# Patient Record
Sex: MALE | Race: WHITE
[De-identification: names, ages, dates, MRNs, and addresses within clinical notes are randomized per-mention and may not be internally consistent; named-entity substitution may affect disease eponyms.]

---

## 2021-06-28 NOTE — XMS
PreManage Notification: AMOS PEACOCK MRN:[id number]
 
Security Information
 
Security Events
No recent Security Events currently on file
 
 
 
CRITERIA MET
------------
- [facility] - 2 Visits in 30 Days
 
 
CARE PROVIDERS
There are no care providers on record at this time.
 
[facility] has no Care Guidelines for this patient.
 
CLAIRE VISIT COUNT (12 MO.)
-------------------------------------------------------------------------------------
[facility]
 
1 [facility]
-------------------------------------------------------------------------------------
TOTAL 5
-------------------------------------------------------------------------------------
NOTE: Visits indicate total known visits.
 
ED/C VISIT TRACKING (12 MO.)
-------------------------------------------------------------------------------------
06/28/2021 06:52
[facility] [place]
 
TYPE: Emergency
 
COMPLAINT:
- WEAKNESS
-------------------------------------------------------------------------------------
[facility]2021 15:[facility]
 
[facility]
 
DIAGNOSES:
- Slow transit constipation
- Pleurodynia
- Back Pain
- Rib Pain
-------------------------------------------------------------------------------------
06/25/2021 13:07
[facility] [place]
 
[facility]
 
DIAGNOSES:
- Rib Pain
- poss lt side rib pain
-------------------------------------------------------------------------------------
 
06/18/2021 17:12
[facility] [place]
 
[facility]
 
DIAGNOSES:
- Constipation
- Constipation, unspecified
- Constipation 3WKS!!!
-------------------------------------------------------------------------------------
08/26/2020 14:49
[facility] [place]
 
[facility]
 
DIAGNOSES:
- Skin Redness With Swelling
- Cellulitis of left lower limb
- Tachycardia, unspecified
- leg swelling
- Localized edema
-------------------------------------------------------------------------------------
 
 
INPATIENT VISIT TRACKING (12 MO.)
No inpatient visits to display in this time frame
 
[email or web address]

## 2021-08-26 NOTE — EKG
[facility]
                                    [address]
                                  [place], [place]  [address]
_________________________________________________________________________________________
                                                                 Signed   
 
 
Sinus tachycardia
Right bundle branch block
Possible Lateral infarct , age undetermined
Abnormal ECG
No previous ECGs available
Confirmed by DUKE REIS DO ([id number]) on 8/26/2021 7:17:55 PM
 
 
 
 
 
 
 
 
 
 
 
 
 
 
 
 
 
 
 
 
 
 
 
 
 
 
 
 
 
 
 
 
 
 
 
 
 
 
 
    Electronically Signed By: DUKE REIS DO  08/26/21 1918
_________________________________________________________________________________________
PATIENT NAME:     AMOS PEACOCK                     
MEDICAL RECORD #: [id number]                     Electrocardiogram             
          ACCT #: [id number]  
DATE OF BIRTH:   [birth year]                                       
PHYSICIAN:   DUKE REIS DO                     REPORT #: [id number]
REPORT IS CONFIDENTIAL AND NOT TO BE RELEASED WITHOUT AUTHORIZATION

## 2021-08-28 NOTE — XMS
PreManage Notification: AMOS PEACOCK MRN:[id number]
 
Security Information
 
Security Events
No recent Security Events currently on file
 
 
 
CRITERIA MET
------------
- [facility] - 2 Visits in 30 Days
- [facility] - 3 Facilities in 90 Days
- 6 ED Visits in 6 Months
 
 
CARE PROVIDERS
There are no care providers on record at this time.
 
[facility] has no Care Guidelines for this patient.
 
E.DEm VISIT COUNT (12 MO.)
-------------------------------------------------------------------------------------
3 [address]
 
3 [facility] [place] [facility].
-------------------------------------------------------------------------------------
TOTAL 7
-------------------------------------------------------------------------------------
NOTE: Visits indicate total known visits.
 
ED/C VISIT TRACKING (12 MO.)
-------------------------------------------------------------------------------------
08/28/2021 18:13
[facility] [place]
 
TYPE: Emergency
 
COMPLAINT:
- LACERATION
-------------------------------------------------------------------------------------
08/25/2021 17:41
[facility] [place]
 
TYPE: Emergency
 
COMPLAINT:
- ABD PAIN
-------------------------------------------------------------------------------------
07/21/2021 18:02
[facility]
 
TYPE: Emergency
 
DIAGNOSES:
- Pneumonia, unspecified organism
 
- Hypoxemia
- EMS
- Acute respiratory failure with hypoxia
-------------------------------------------------------------------------------------
06/28/2021 06:52
[facility] [place]
 
TYPE: Emergency
 
COMPLAINT:
- WEAKNESS
 
DIAGNOSES:
- Other symptoms and signs involving the musculoskeletal system
- Other intervertebral disc degeneration, thoracic region
- Nicotine dependence, unspecified, uncomplicated
- Chronic obstructive pulmonary disease, unspecified
- Weakness
- Hematuria, unspecified
-------------------------------------------------------------------------------------
06/25/2021 15:36
[facility] [place]
 
TYPE: Emergency
 
DIAGNOSES:
- Slow transit constipation
- Pleurodynia
- Back Pain
- Rib Pain
-------------------------------------------------------------------------------------
06/25/2021 13:07
[facility]     [place]
 
TYPE: Emergency
 
DIAGNOSES:
- Rib Pain
- poss lt side rib pain
- Procedure and treatment not carried out due to patient leaving prior to being seen
by health care provider
-------------------------------------------------------------------------------------
06/18/2021 17:12
[facility]     [place]
 
TYPE: Emergency
 
DIAGNOSES:
- Constipation
- Constipation, unspecified
- Constipation 3WKS!!!
-------------------------------------------------------------------------------------
 
 
INPATIENT VISIT TRACKING (12 MO.)
-------------------------------------------------------------------------------------
07/21/2021 18:02
[facility]       [place]
 
 
TYPE: General Medicine
 
DIAGNOSES:
- Acute respiratory failure with hypoxia
- Pneumonia, unspecified organism
-------------------------------------------------------------------------------------
06/28/2021 16:20
[facility]       [place]
 
TYPE: Surgery
 
DIAGNOSES:
- Intraspinal abscess and granuloma
- T7 osteomyelitis with lower extremity paralysis
- Osteomyelitis, unspecified
-------------------------------------------------------------------------------------
 
[email or web address]

## 2021-09-05 NOTE — XMS
PreManage Notification: AMOS PEACOCK MRN:[id number]
 
Security Information
 
Security Events
No recent Security Events currently on file
 
 
 
CRITERIA MET
------------
- 6 ED Visits in 6 Months
- [facility] - 3 Facilities in 90 Days
- [facility] - 2 Visits in 30 Days
 
 
CARE PROVIDERS
There are no care providers on record at this time.
 
[facility] has no Care Guidelines for this patient.
Care History
Medical/Surgical
08/30/2021    [facility]
 
      - RECVD CASE MANAGEMENT CONSULT-TO HELP PATIENT ESTABLISH WITH A PCP IN THE 
      AREA.
      - [facility] CALLED PATIENT 2X- NO ANSWER- VOICEMAIL BOX HAS NOT BEEN SET UP TO LEAVE 
      A MESSAGE.
      - NO PCP LETTER SENT TO PATIENT- WITH CLINICS LISTED IN THE AREA.
CLAIRE VISIT COUNT (12 MO.)
-------------------------------------------------------------------------------------
3 [facility]
 
1 [address]
-------------------------------------------------------------------------------------
TOTAL 8
-------------------------------------------------------------------------------------
NOTE: Visits indicate total known visits.
 
ED/UCC VISIT TRACKING (12 MO.)
-------------------------------------------------------------------------------------
09/05/2021 11:05
[facility] [place]
 
TYPE: Emergency
 
COMPLAINT:
- WEAKNESS
-------------------------------------------------------------------------------------
08/28/2021 18:13
[facility] [place]
 
TYPE: Emergency
 
COMPLAINT:
- LACERATION
 
 
DIAGNOSES:
- Tachycardia, unspecified
- Nicotine dependence, unspecified, uncomplicated
- Chronic obstructive pulmonary disease, unspecified
- Contact with other sharp object(s), not elsewhere classified, initial encounter
- Laceration without foreign body, right lower leg, initial encounter
-------------------------------------------------------------------------------------
08/25/2021 17:41
[facility] [place]
 
TYPE: Emergency
 
COMPLAINT:
- ABD PAIN
 
DIAGNOSES:
- Other stimulant abuse, uncomplicated
- Unspecified abdominal pain
- Patient's noncompliance with other medical treatment and regimen
- Nicotine dependence, unspecified, uncomplicated
- Chronic obstructive pulmonary disease, unspecified
-------------------------------------------------------------------------------------
07/21/2021 18:02
[facility]
 
TYPE: Emergency
 
DIAGNOSES:
- Pneumonia, unspecified organism
- Hypoxemia
- EMS
- Acute respiratory failure with hypoxia
-------------------------------------------------------------------------------------
06/28/2021 06:52
[facility]
 
TYPE: Emergency
 
COMPLAINT:
- WEAKNESS
 
DIAGNOSES:
- Other symptoms and signs involving the musculoskeletal system
- Other intervertebral disc degeneration, thoracic region
- Nicotine dependence, unspecified, uncomplicated
- Chronic obstructive pulmonary disease, unspecified
- Weakness
- Hematuria, unspecified
-------------------------------------------------------------------------------------
[facility]2021 15:[facility] [place]
 
[facility]: [facility]
 
DIAGNOSES:
- Slow transit constipation
- Pleurodynia
- Back Pain
- Rib Pain
 
-------------------------------------------------------------------------------------
06[facility]25/2021 13:07
[facility]
 
[facility]
 
DIAGNOSES:
- Rib Pain
- poss lt side rib pain
- Procedure and treatment not carried out due to patient leaving prior to being seen
by health care provider
-------------------------------------------------------------------------------------
06/18/2021 17:12
[facility] [place]
 
TYPE: Emergency
 
DIAGNOSES:
- Constipation
- Constipation, unspecified
- Constipation 3WKS!!!
-------------------------------------------------------------------------------------
 
 
INPATIENT VISIT TRACKING (12 MO.)
-------------------------------------------------------------------------------------
07/21/2021 18:02
[facility]       [place]
 
TYPE: General Medicine
 
DIAGNOSES:
- Acute respiratory failure with hypoxia
- Pneumonia, unspecified organism
-------------------------------------------------------------------------------------
06/28/2021 16:20
[facility]       [place]
 
TYPE: Surgery
 
DIAGNOSES:
- Intraspinal abscess and granuloma
- T7 osteomyelitis with lower extremity paralysis
- Osteomyelitis, unspecified
-------------------------------------------------------------------------------------
 
[email or web address]

## 2021-09-05 NOTE — NUR
SHIFT REPORT RECEIVED FROM DAYSHIFT AMERICA MONTERROSO AT BEDSIDE. pt AWAKE AND
RESTING IN BED, EATING EVENING SNACK. NO NEEDS VERBALIZED, CALL LIGHT IN REACH
AND BOARD UPDATED. BLE ELEVATED WITH USE OF MULTIPLE PILLOWS.

## 2021-09-05 NOTE — NUR
IV FLUIDS ARRIVED FROM [facility]. STARTED PER MD ORDER. PT REPORTS HUNGER,
SANDWICH BOX PROVIDED. BLE ELEVATED ON PILLOWS PER MD ORDER. LEGS OF BED
ELEVATED AS WELL. OXGYEN SATURATION REMAINS 935 ON ROOM AIR. PT EATING WHILE
IN BED, HEAD OF BED ELEVATED TO 53 DEGREES. PT REPORTS HE IS UNABLE TO STAND
OR TRANSFER AT THIS TIME. PT CONTINUES TO BE VERY FIGITY. BED ALARM ON. CALL
LIGHT WITHIN REACH.

## 2021-09-05 NOTE — NUR
ASSESSMENT COMPLETE, VSS. pt ON TELE#6, NSR TO SINUS TACH, HR VARIES 90'S TO
ONE TEENS.
pt A/O, PHOTO CONSENT SIGNED
REGARDING BLE OPEN LACERATIONS. PHOTOS IN CHART. DRESSING
CHANGED COMPLETE WITH HELP FROM AMERICA MERCHANT. SKIN CLEANED WITH WOUND CLEANSER, NONADHERENT
DRESSING OVER OPEN SORES X3 WITH DRY DRESSING IN PLACE PER MD ORDERS.
PHOTOS IN CHART. pt JERKY WITH MUSCLE MOVEMENTS AT TIMES, FOLLOWS COMMANDS.
BLE ELEVATED WITH MULTIPLE PILLOWS PER MD ORDERS. NO FURTHER NEEDS, FRESH
WATER AND CALL LIGHT IN REACH. BED ALARM ON.

## 2021-09-05 NOTE — NUR
REPORT RECEIVED FROM AMERICA COOLEY. PT ARRIVED TO MED/SURG. 2 PERSON ASSIST
SLIDE TRANSFER TO BED. PT VERY JITTERY, GRABBING AT THINGS IN AIR AND FIGITING
IN BED. PT DENIES PAIN AND NAUSEA. VITAL SIGNS STABLE. PT WEANED TO ROOM AIR
AND MAITINING OXYGEN SATURATIONS ABOVE 90%. IV MAGNESIUM STARTED. AWAITING
FLUIDS FROM PHARMACY. NO ADDITIONAL REQUESTS OR COMPLAINTS. CALL LIGHT WITH IN
REACH. BED RAILS UP.

## 2021-09-05 NOTE — EKG
[facility]
                                    [address]
                                  [place]  [address]
_________________________________________________________________________________________
                                                                 Signed   
 
 
Sinus tachycardia with premature supraventricular complexes
Left axis deviation
Right bundle branch block
Abnormal ECG
When compared with ECG of 25-AUG-2021 19:55,
premature supraventricular complexes are now present
QRS axis shifted left
Borderline criteria for Lateral infarct are no longer present
Confirmed by DAPHNEY MILLER MD ([id number]) on 9/5/2021 4:27:12 PM
 
 
 
 
 
 
 
 
 
 
 
 
 
 
 
 
 
 
 
 
 
 
 
 
 
 
 
 
 
 
 
 
 
 
 
 
    Electronically Signed By: DAPHNEY MILLER MD  09/05/21 1627
_________________________________________________________________________________________
PATIENT NAME:     AMOS PEACOCK                     
MEDICAL RECORD #: [id number]                     Electrocardiogram             
          ACCT #: [id number]  
DATE OF BIRTH:   [birth year]                                       
PHYSICIAN:   DAPHNEY MILLER MD           REPORT #: [id number]
REPORT IS CONFIDENTIAL AND NOT TO BE RELEASED WITHOUT AUTHORIZATION
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
## 2021-09-05 NOTE — NUR
BED ALARMING, THIS RN IN ROOM TO ASSESS. pt DENIES TRYING TO GET OOB, JUST
REPOSITIONING. WITH HELP FROM CHARGE RN LYNETTE, pt REPOSITIONED IN BED,
PILLOW UNDER LEFT HIP. BLE REMAIN ELEVATED WITH PILLOWS. CHARGE RN IN ROOM
PLACING pt ON TELE.

## 2021-09-06 NOTE — NUR
ASSESSMENT COMPLETE, NO NEW CHANGES OR CONCERNS. pt REPORTS TOLERABLE 5/10
PAIN. DENEIS NEEDS FOR PAIN MEDICATION AT THIS TIME, CALL LIGHT IN REACH.

## 2021-09-06 NOTE — NUR
PATIENT CALLED AND SAID HE HAD AN INC. BM. THIS CNA AND RN CAIT IN TO CHANGE
PATIENT. EDIN CARE DONE. NEW ATTNEDS IN PLACE. HELPED RN WITH DRESSING CHNAGE.
REPOSITIONED. VITALS AND I&O'S CHARTED. CALL LIGHT IN REACH. NO FURTHER NEEDS
AT THIS TIME.
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
## 2021-09-06 NOTE — NUR
Dressing change done to left/right posterior calf lacerations. Cleaned wounds
with antibacterial soap, dry dressing then ace wrap for compression. Legs
elevatd on pillows at this time. .

## 2021-09-06 NOTE — NUR
pt RESTING IN BED, RR EVEN AND UNLABORED. NO DISTRESS NOTED, CALL LIGHT IN
REACH. BED ALARM ON FOR SAFETY.

## 2021-09-06 NOTE — NUR
NURSE INITIATED WOUND CARE NURSE CONSULT PLACED D/T LACERATIONS TO LEFT AND
RIGHT CALF. CHARGE AMERICA SALGADO.

## 2021-09-06 NOTE — NUR
ICU called over to notify staff that pt's heart rate dropped to 45bpm for
approximately ten seconds. Pt sleeping at time, vital taken and stable. Apical
hear rate 98bpm at times of assessment. No distress, pt awoke easily and
denied any distress or palpitations.

## 2021-09-06 NOTE — NUR
PATIENT HAD INC. BM. THIS CNA AND RN CAIT IN TO CHANGE PATIENT. PATIENT
HAD XL LIQUID BM. EDIN CARE DONE. LINENS CHANGED. NEW ATTENDS IN PLACE. CALL
LIGHT IN REACH. NO FURTHER NEEDS AT THIS TIME.

## 2021-09-06 NOTE — NUR
PATIENT SITTING UP IN BED EATING BREAKFAST. VITALS ADN I&O'S CHARTED. RN IN
ROOM. PATIENT REFUSED AM CARE AND WASHCLOTH. CALL LIGHT IN REACH. NO FURTHER
NEEDS AT THIS TIME.

## 2021-09-06 NOTE — NUR
EVENING ASSESSMENT COMPLETE. SCHEDULED MEDS ADMINISTERED PER EMAR. PT DENIES
PAIN OR NAUSEA AT THIS TIME. BLE WRAPPED WITH ACE AND ELEVATED. EDEMA NOTED.
BILAT HANDS 1+ EDEMA. URINAL EMPTIED  ML YELLOW URINE. PT DENIES
QUESTIONS OR CONCERNS. ASSISTED TO REPOSTION IN BED. WARM BLANKET PROVIDED.
CALL LIGHT IN REACH.

## 2021-09-07 NOTE — NUR
Vitals, I&Os are complete. Patient said they are pretty sure they have not
had a BM yet because they worked with physical therapy a little while ago. The
patient would like me to check back to see if he's had a BM in a little bit.
The call light is in reach. The patient is wondering where his phone is, but I
still have not seen it within the room. Ice water was requests and there are
no further requests.

## 2021-09-07 NOTE — NUR
Pt does not have a PCP and is willing to take anyone he can see in a short
time.  Called Shannon at [facility] and she will check with Dr. Wei and Dr. Thomson as they are still taking pts. Will fax ER note, H&P,and progress
notes.

## 2021-09-07 NOTE — NUR
Called and spoke with June Ramirez at [facility].  She states pt is in her system.
She will speak with pt and I will return to his room and assist him to call
her.  She is unavailable for 30 min.  Will have him call her at 11:00 to check
for what assistance he is eligible for.

## 2021-09-07 NOTE — NUR
THIS RN IN PTS ROOM DUE TO PT MENTIONING THAT HE WANTED TO STAY ON TOP OF
TYLENOL FOR HIS CHRONIC PAIN. THIS RN PROVIDED PT WITH 500MG OF TYLENOL. PT
STATES OTHERWISE IS DOING WELL

## 2021-09-07 NOTE — NUR
Patient sleeping, eyes closed, respirations even and non labored. Patient has
no notable distress. Personal supplies and call light within reach.

## 2021-09-07 NOTE — NUR
EVENING ASSESSMENT COMPLETE. SCHEDULED MEDS ADMINISTERED PER EMAR. PT DENIES
PAIN OR NAUSEA. PT INCONTINENT OF URINE. EDIN CARE COMPLETE. OPEN AREAS
NOTED UNDER ABD FOLDS. AREA CLEANED AND BARRIER CREAM APPLIED. SMALL SKIN TEAR
NOTED ON LEFT BUTTOCK. ALLEVYN PLACED. DRESSING CHANGE TO BLE COMPLETED AS
ORDERED. BLE ELEVATED. ASSISTED PT TO REPOSITION IN BED. PT DENIES QUESTIONS
OR CONCERNS. CALL LIGHT IN REACH.

## 2021-09-07 NOTE — NUR
VS AND I&O COMPLETE. ASSISTED PT TO REPOSITION IN BED. BLE ELEVATED. ON
PILLOWS. DENIES PAIN OR NAUSEA. PT DENIES NEEDS. CALL LIGHT IN REACH.

## 2021-09-07 NOTE — NUR
Called and spoke with [facility], and [facility]. 0 are able to take a pt at this time either due to covid or bed
availability.

## 2021-09-07 NOTE — NUR
Pt states he has not been able to walk since surgery a year ago.  He
was staying in a motor home on friends property, but can no longer get
into the motor home as he cannot use his legs.  Wife was able to aye
t but cannot lift him.  He is now living in a tent next to the motor-
home.  He has a wc and commode he uses.  Also has a walker and shower
chair but is unable to use.  He and wife are living on his disability
of $800 per month.  She has not worked in 2 years.  Discussed options
of a SNF, low income apartment, or halfway.  Pt would prefer an halfway even
if he has to serparate from his wife.  The have been waiting for low
income housing for 1 year.  Pt discharged himself the a SNF in August
as he did not like it.  Pt states he is unable to care for self and
wife is unable to lift him.  I will contact [facility].

## 2021-09-07 NOTE — NUR
Patient is talking on the phone. Vitals, I&Os are complete. Patient has not
had a BM in 4 hours. RN will be notified. Call light is in reach.

## 2021-09-07 NOTE — NUR
ASSISTED PRIMARY RN MICHAEL CHANGED ACE WRAPPED ON BOTH LEGS. CLEANED
INCONTINENT URINE AND STOOL AND PLACED FRESH ATTEND.

## 2021-09-07 NOTE — NUR
Called and spoke with June Venegas from [facility].  She was able to speak with
Jordy and has set up meeting with financial team for tomorrow and meeting
with their  for next wed.  She thinks pt will qualify, it is never
guaranteed until confirmed by team, pt will have to pay $650 of his $850 SSD.
Pt is agreeable to this.  Pt will need to dc to home with wife, while [facility]
completes assessments.  I will contact [facility] in the area to attempt to find a
bed.

## 2021-09-07 NOTE — NUR
PT INCONTINENT OF MED AMOUNT SOFT BROWN BM. EDIN CARE DONE. CLEAN ATTENDS
PLACED. URINAL EMPTIED. SCHEDULED MEDS ADMINISTERED PER EMAR. CREAM APPLIED TO
BLE AS ORDERED. BLE WRAPPED WITH CLEAN ACE WRAPS AND ELEVATED WITH PILLOWS.
PT DIANA WELL. ASSESSMENT COMPLETE. NO FURTHER NEEDS. CALL LIGHT IN REACH.

## 2021-09-07 NOTE — NUR
3 PA ASSISTING PRIMARY RN MICHAEL CHANGE DRESSING AND ACE WRAPPED BOTH LOWER
LEGS. V/S AND I&O'S DONE. WIPED EDIN AREA AND APPLIED BARRIER CREAM AND
ALLEVYN BY RN. CHANGED FRESH GOWN. PATIENT REPOSITIONED.

## 2021-09-07 NOTE — NUR
REPORT RECEIVED FROM DAY SHIFT RN. PT LYING IN BED ALERT AND ORIENTED
WATCHING TV. URINAL EMPTIED. BLE ELEVATED ON PILLOWS. DENIES NEEDS. WHITE
BOARD UPDATED. CALL LIGHT IN REACH.
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
## 2021-09-07 NOTE — NUR
Patient was very active with washing their own face and hands with a warm wash
cloth. Patient would like a bed bath sometime today. Patient has had their
breakfast ordered. There are no other requests at this time.

## 2021-09-08 NOTE — NUR
THIS RN IN PTS ROOM TO GIVE PT HIS MORNING MEDS AND DO MORNING DRESSING
CHANGE. PT STATES THAT HE IS DOING WELL AND PT ABLE TO ASSIST SOMEWHAT WITH
BEDBATH. PT ABLE TO ROLL WELL AND ASSIST WITH LINEN CHANGE AND MINMALLY ABLE
TO LIFT LEGS A COUPLE INCHES FROM BED FOR 1-2SECONDS BEFORE NEEDING TO LOWER
BACK DOWN DUE TO WEAKNESS.
THIS RN ABLE TO CLEAN PTS LEGS WITH WOUND  AND THEN PLACED
HYDROCORTISONE CREAM TO AFFTED AREAS ON LEGS, DID NOT PLACE ON OPEN WOUNDS.
THIS RN PLACED A NON ADHERENT PAD OVER OPEN WOUND AND THEN WRAPPED WITH ACE
WRAPS. PT TOLERATED WELL AND HAS NO OTHER CONCERNS AT THIS TIME

## 2021-09-08 NOTE — NUR
PT RESTING IN BED WITH EYES CLOSED. RESPIRATIONS EVEN. URINAL EMPTIED 
ML CLEAR YELLOW URINE. FRESH WATER PROVIDED. CALL LIGHT IN REACH.

## 2021-09-08 NOTE — NUR
Spoke with Jordy again discussed options for dc.  He declines a SNF.  He
states he wants to go home.  He now unsure if he wants assists from [facility]. Again
stated my concern for the next month when the weather turns cold and how he
will do in a tent.   He states he thinks he can get himself into the motor
home needed.  I asked if he has successfully gotten into the motor home
without assist and he states no, never. I encouraged him to work with [employer] to
be evaluated to see what he can qualify for.  His concern is for leaving his
wife and she would only have $177 per month from his disability check.  I did
point out she would be living in the motor home this winter while he would be
in a tent.  He is aware [facility] will call for a financial eval today.  I plugged
his phone in as it is not charged.

## 2021-09-08 NOTE — NUR
THIS RN IN PTS ROOM PER PT REQUEST TO HELP READJUST HIM IN BED. THIS RN HAD
ASSISTANCE FROM [facility]. PT AGREEABLE TO A BED BATH LATER ON. PT DENIES
DESIRE TO HAVE PILLOWS UNDER HIS HIPS AT THIS TIME BUT IS REPOSITIONED AND SAT
UP FOR BREAKFAST AT [facility] TIME

## 2021-09-08 NOTE — NUR
Checked with Elia he has not yet received a call from [employer].  I also
discussed his drug use and we then discussed [facility] program.  He states he has
been and addict for most of his life.  He would be willing to speak with Peer
to Peer.  Called and spoke with Magali and she attempt to see today.  Gave
home phone number and pts address and she will see at home, if not at the
hospital.

## 2021-09-08 NOTE — NUR
VS AND I&O COMPLETE. URINAL EMPTIED. PT ALSO INCONTINENT OF LARGE AMOUNT OF
URINE. EDIN CARE DONE. CLEAN LINENS PROVIDED. 2PA TO REPOSITION IN BED. BLE
ELEVATED ON PILLOWS. ACE WRAPS IN PLACE. NO FURTHER NEEDS AT THIS TIME. CALL
LIGHT IN REACH.

## 2021-09-08 NOTE — NUR
Spoke with June Venegas and updated pt is concerned about placement as his
wife would not have money to live on.  She suggested a better option of a home
caregiver from the state to assist when needed.  Pt would not have to pay out
from his disability check for this.  UPdated pt and he likes this idea better.
Awaiting call for financial eval from [facility] at 1530.  Pt plans on dc to home
today.   has ordered .  Pt would like [facility] from [facility],
will fax chart.

## 2021-09-08 NOTE — NUR
Bed bath completed, with RN, 2PA, 9:59am.
Patient was active moderately in brushing
hair. Linens were changed over night. Call light is in reach.

## 2021-09-08 NOTE — NUR
THIS RN IN PTS ROOM TO CHECK ON PT. PT STATES THAT HE IS DOING GOOD BUT WOULD
LIKE TO BE REPOSITIONED, EDEN RN FROM CASE MANAGEMENT ABLE TO ASSIST THIS RN
WHILE SHE WAS IN ROOM WITH PT TO ASSIST WITH DISCHARGE PLANNING. PT AGREEABLE
TO HAVING WIFE BRING IN HIS WHEELCHAIR AND THEN HAVING WHEELCHAIR VAN TAKE
HIM HOME.

## 2021-09-08 NOTE — NUR
ASSESSMENT COMPLETE. PT DENIES PAIN OR NAUSEA. ASSISTED TO REPOSITION IN BED.
URINAL EMPTIED. SNACK PROVIDED.

## 2021-09-08 NOTE — NUR
Called and spoke with [facility], they have been find a DrEm to establish care with
this pt.  Pt has been assigned to Dr. Wei on Sept 23 at 11:50. Will add
appt to pts dc.

## 2021-09-08 NOTE — NUR
THIS RN RECEIVED REPORT FROM MICHAEL CROSS. THIS RN IN TO CHECK ON PT. PT APPEARS
TO BE RESTING WITH FIDGETS NOTED BUT HAS CLEAR AND DEEP RESPIRTAIONS NOTED.

## 2021-09-08 NOTE — NUR
Face sheet, covid test, Dc summary, H&P, med list, PT note faxed to [facility]. Spoke with KEKE and they can see at the beginning of next week.

## 2021-12-10 NOTE — XMS
PreManage Notification: AMOS PEACOCK MRN:[id number]
 
Security Information
 
Security Events
1 event(s) in the past 18 months
Most recent security events:
 
Elopement at [facility] 08/28/2021 18:13
  -    Other
Details:
    PATIENT LEFT AMA.
 
 
 
CRITERIA MET
------------
- 6 ED Visits in 6 Months
 
 
CARE PROVIDERS
There are no care providers on record at this time.
 
[facility] has no Care Guidelines for this patient.
Care History
Medical/Surgical
08/30/2021    [facility]
 
      - RECVD CASE MANAGEMENT CONSULT-TO HELP PATIENT ESTABLISH WITH A PCP IN THE 
      AREA.
      - CHW CALLED PATIENT 2X- NO ANSWER- VOICEMAIL BOX HAS NOT BEEN SET UP TO LEAVE 
      A MESSAGE.
      - NO PCP LETTER SENT TO PATIENT- WITH CLINICS LISTED IN THE AREA.
E.D. VISIT COUNT (12 MO.)
-------------------------------------------------------------------------------------
3 [facility]
 
[address]
-------------------------------------------------------------------------------------
TOTAL 9
-------------------------------------------------------------------------------------
NOTE: Visits indicate total known visits.
 
ED/UCC VISIT TRACKING (12 MO.)
-------------------------------------------------------------------------------------
12/10/2021 09:13
[facility]
 
TYPE: Emergency
 
COMPLAINT:
- FELL OUT OF WHEELCHAIR
-------------------------------------------------------------------------------------
09/05/2021 11:05
[facility]
 
TYPE: Emergency
 
 
COMPLAINT:
- WEAKNESS
-------------------------------------------------------------------------------------
08/28/2021 18:13
[facility]
 
TYPE: Emergency
 
COMPLAINT:
- LACERATION
 
DIAGNOSES:
- Tachycardia, unspecified
- Nicotine dependence, unspecified, uncomplicated
- Chronic obstructive pulmonary disease, unspecified
- Contact with other sharp object(s), not elsewhere classified, initial encounter
- Laceration without foreign body, right lower leg, initial encounter
-------------------------------------------------------------------------------------
08/25/2021 17:41
[facility]
 
TYPE: Emergency
 
COMPLAINT:
- ABD PAIN
 
DIAGNOSES:
- Other stimulant abuse, uncomplicated
- Unspecified abdominal pain
- Patient's noncompliance with other medical treatment and regimen
- Nicotine dependence, unspecified, uncomplicated
- Chronic obstructive pulmonary disease, unspecified
-------------------------------------------------------------------------------------
07/21/2021 18:02
[facility]
 
TYPE: Emergency
 
DIAGNOSES:
- Pneumonia, unspecified organism
- Hypoxemia
- EMS
- Acute respiratory failure with hypoxia
-------------------------------------------------------------------------------------
06/28/2021 06:52
[facility]
 
TYPE: Emergency
 
COMPLAINT:
- WEAKNESS
 
DIAGNOSES:
- Other symptoms and signs involving the musculoskeletal system
- Other intervertebral disc degeneration, thoracic region
- Nicotine dependence, unspecified, uncomplicated
- Chronic obstructive pulmonary disease, unspecified
- Weakness
 
- Hematuria, unspecified
-------------------------------------------------------------------------------------
06/25/2021 15:36
[facility]
 
TYPE: Emergency
 
DIAGNOSES:
- Slow transit constipation
- Pleurodynia
- Back Pain
- Rib Pain
-------------------------------------------------------------------------------------
06/25/2021 13:07
[facility]     [facility] [facility]
 
TYPE: Emergency
 
DIAGNOSES:
- Rib Pain
- poss lt side rib pain
- Procedure and treatment not carried out due to patient leaving prior to being seen
by health care provider
-------------------------------------------------------------------------------------
06/18/2021 17:12
[facility]     [facility]
 
TYPE: Emergency
 
DIAGNOSES:
- Constipation
- Constipation, unspecified
- Constipation 3WKS!!!
-------------------------------------------------------------------------------------
 
 
INPATIENT VISIT TRACKING (12 MO.)
-------------------------------------------------------------------------------------
09/06/2021 14:05
[facility]
 
TYPE: Medical Surgical
 
COMPLAINT:
- FALL/DEHYDRATION/SVT
 
DIAGNOSES:
- Other specified postprocedural states
- Cellulitis of left lower limb
- Other stimulant use, unspecified, uncomplicated
- Pneumonia, unspecified organism
- Dependence on wheelchair
- Supraventricular tachycardia
- Cellulitis of right lower limb
- Venous insufficiency (chronic) (peripheral)
- Dehydration
- Homelessness
- Schizophrenia, unspecified
- Dependence on wheelchair
- Homelessness
 
- Supraventricular tachycardia
- Schizophrenia, unspecified
- Venous insufficiency (chronic) (peripheral)
- Unspecified fall, initial encounter
- Other specified postprocedural states
- Rhabdomyolysis
- Weakness
- Unspecified fall, initial encounter
- Paraplegia, unspecified
- Paraplegia, unspecified
- Rhabdomyolysis
- Hypokalemia
- Dehydration
- Cannabis use, unspecified, uncomplicated
- Chronic obstructive pulmonary disease, unspecified
- Other stimulant use, unspecified, uncomplicated
- Cannabis use, unspecified, uncomplicated
- Chronic obstructive pulmonary disease, unspecified
- Hypokalemia
- Nicotine dependence, cigarettes, uncomplicated
- Nicotine dependence, cigarettes, uncomplicated
-------------------------------------------------------------------------------------
07/21/2021 18:02
[facility]
 
TYPE: General Medicine
 
DIAGNOSES:
- Acute respiratory failure with hypoxia
- Pneumonia, unspecified organism
-------------------------------------------------------------------------------------
06/28/2021 16:20
[facility]
 
TYPE: Surgery
 
DIAGNOSES:
- Intraspinal abscess and granuloma
- T7 osteomyelitis with lower extremity paralysis
- Osteomyelitis, unspecified
-------------------------------------------------------------------------------------
 
[email or web address]

## 2021-12-13 NOTE — XMS
PreManage Notification: AMOS PEACOCK MRN:[id number]
 
Security Information
 
Security Events
1 event(s) in the past 18 months
Most recent security events:
 
Elopement at [facility] 08/28/2021 18:13
  -    Other
Details:
    PATIENT LEFT AMA.
 
 
 
CRITERIA MET
------------
- [facility] - 2 Visits in 30 Days
- 6 ED Visits in 6 Months
 
 
CARE PROVIDERS
There are no care providers on record at this time.
 
[facility] has no Care Guidelines for this patient.
Care History
Medical/Surgical
08/30/2021    [facility]
 
      - RECVD CASE MANAGEMENT CONSULT-TO HELP PATIENT ESTABLISH WITH A PCP IN THE 
      AREA.
      - [facility] CALLED PATIENT 2X- NO ANSWER- VOICEMAIL BOX HAS NOT BEEN SET UP TO LEAVE 
      A MESSAGE.
      - NO PCP LETTER SENT TO PATIENT- WITH CLINICS LISTED IN THE AREA.
E.D. VISIT COUNT (12 MO.)
-------------------------------------------------------------------------------------
3 [facility]
 
[address]
-------------------------------------------------------------------------------------
TOTAL 10
-------------------------------------------------------------------------------------
NOTE: Visits indicate total known visits.
 
ED/UCC VISIT TRACKING (12 MO.)
-------------------------------------------------------------------------------------
12/13/2021 19:27
[facility]
 
TYPE: Emergency
 
COMPLAINT:
- ABDOMINAL PAIN
-------------------------------------------------------------------------------------
12/10/2021 09:13
[facility]
 
 
TYPE: Emergency
 
COMPLAINT:
- FELL OUT OF WHEELCHAIR
-------------------------------------------------------------------------------------
09/05/2021 11:05
[facility]
 
TYPE: Emergency
 
COMPLAINT:
- WEAKNESS
-------------------------------------------------------------------------------------
08/28/2021 18:13
[facility]
 
TYPE: Emergency
 
COMPLAINT:
- LACERATION
 
DIAGNOSES:
- Tachycardia, unspecified
- Nicotine dependence, unspecified, uncomplicated
- Chronic obstructive pulmonary disease, unspecified
- Contact with other sharp object(s), not elsewhere classified, initial encounter
- Laceration without foreign body, right lower leg, initial encounter
-------------------------------------------------------------------------------------
08/25/2021 17:41
[facility]
 
TYPE: Emergency
 
COMPLAINT:
- ABD PAIN
 
DIAGNOSES:
- Other stimulant abuse, uncomplicated
- Unspecified abdominal pain
- Patient's noncompliance with other medical treatment and regimen
- Nicotine dependence, unspecified, uncomplicated
- Chronic obstructive pulmonary disease, unspecified
-------------------------------------------------------------------------------------
07/21/2021 18:02
[facility]
 
TYPE: Emergency
 
DIAGNOSES:
- Pneumonia, unspecified organism
- Hypoxemia
- EMS
- Acute respiratory failure with hypoxia
-------------------------------------------------------------------------------------
06/28/2021 06:52
[facility]
 
TYPE: Emergency
 
 
COMPLAINT:
- WEAKNESS
 
DIAGNOSES:
- Other symptoms and signs involving the musculoskeletal system
- Other intervertebral disc degeneration, thoracic region
- Nicotine dependence, unspecified, uncomplicated
- Chronic obstructive pulmonary disease, unspecified
- Weakness
- Hematuria, unspecified
-------------------------------------------------------------------------------------
06/25/2021 15:36
[facility]     [place]
 
TYPE: Emergency
 
DIAGNOSES:
- Slow transit constipation
- Pleurodynia
- Back Pain
- Rib Pain
-------------------------------------------------------------------------------------
06/25/2021 13:07
[facility]     [facility]
 
TYPE: Emergency
 
DIAGNOSES:
- Rib Pain
- poss lt side rib pain
- Procedure and treatment not carried out due to patient leaving prior to being seen
by health care provider
-------------------------------------------------------------------------------------
06/18/2021 17:12
[facility]     [facility]
 
TYPE: Emergency
 
DIAGNOSES:
- Constipation
- Constipation, unspecified
- Constipation 3WKS!!!
-------------------------------------------------------------------------------------
 
 
INPATIENT VISIT TRACKING (12 MO.)
-------------------------------------------------------------------------------------
09/06/2021 14:05
[facility]
 
TYPE: Medical Surgical
 
COMPLAINT:
- FALL/DEHYDRATION/SVT
 
DIAGNOSES:
- Other specified postprocedural states
- Cellulitis of left lower limb
- Other stimulant use, unspecified, uncomplicated
- Pneumonia, unspecified organism
 
- Dependence on wheelchair
- Supraventricular tachycardia
- Cellulitis of right lower limb
- Venous insufficiency (chronic) (peripheral)
- Dehydration
- Homelessness
- Schizophrenia, unspecified
- Dependence on wheelchair
- Homelessness
- Supraventricular tachycardia
- Schizophrenia, unspecified
- Venous insufficiency (chronic) (peripheral)
- Unspecified fall, initial encounter
- Other specified postprocedural states
- Rhabdomyolysis
- Weakness
- Unspecified fall, initial encounter
- Paraplegia, unspecified
- Paraplegia, unspecified
- Rhabdomyolysis
- Hypokalemia
- Dehydration
- Cannabis use, unspecified, uncomplicated
- Chronic obstructive pulmonary disease, unspecified
- Other stimulant use, unspecified, uncomplicated
- Cannabis use, unspecified, uncomplicated
- Chronic obstructive pulmonary disease, unspecified
- Hypokalemia
- Nicotine dependence, cigarettes, uncomplicated
- Nicotine dependence, cigarettes, uncomplicated
-------------------------------------------------------------------------------------
07/21/2021 18:02
[facility]       [id number]
[place]
 
TYPE: General Medicine
 
DIAGNOSES:
- Acute respiratory failure with hypoxia
- Pneumonia, unspecified organism
-------------------------------------------------------------------------------------
06/28/2021 16:20
[facility]       [id number]
Center
 
TYPE: Surgery
 
DIAGNOSES:
- Intraspinal abscess and granuloma
- T7 osteomyelitis with lower extremity paralysis
- Osteomyelitis, unspecified
-------------------------------------------------------------------------------------
 
[email or web address]

## 2021-12-14 NOTE — OR
[facility]
                                    [address], [place]  [address]
_________________________________________________________________________________________
                                                                 Signed   
 
 
DATE OF OPERATION:
12/14/2021
 
SURGEON:
Matty Dow MD
 
PREOPERATIVE DIAGNOSES:
1. Urosepsis with associated fevers, chills, leukocytosis, and hypotension.
2. Actively obstructing 2 mm left ureterovesical junction ureteral calculus.
3. Active urinary tract infection.
 
POSTOPERATIVE DIAGNOSES:
1. Urosepsis with associated fevers, chills, leukocytosis, and hypotension.
2. Actively obstructing 2 mm left ureterovesical junction ureteral calculus.
3. Active urinary tract infection.
 
NAMES OF PROCEDURES:
1. Diagnostic cystoscopy.
2. Semi-rigid ureteroscopy with basket extraction of distal left ureteral calculus.
3. Insertion of a 6 x 26 cm double-J ureteral stent into the left collecting system.
 
ANESTHESIA:
General.
 
ESTIMATED BLOOD LOSS:
None.
 
COMPLICATIONS:
None.
 
SPECIMENS:
A 2 mm stone sent to the lab for stone analysis.
 
DRAINS:
1. A 6 x 26 cm double-J ureteral stent inserted into the left collecting system.
2. A 16-Lebanese Palomino catheter, connected to gravity drainage.
 
INDICATIONS FOR PROCEDURE:
Mr. Diallo is a very pleasant 61-year-old gentleman who presented to the emergency
department late last night with fevers, chills, and mental status changes.  His white
blood cell count on admission was 23 and he was then experiencing acute renal
insufficiency with a creatinine of 1.9.  His baseline renal function is not known.  He
 
    Electronically Signed By: MATTY DOW MD  12/14/21 1904
_________________________________________________________________________________________
PATIENT NAME:     AMOS DIALLO                     
MEDICAL RECORD #: [id number]            OPERATIVE REPORT              
          ACCT #: [id number]  
DATE OF BIRTH:   [birth year]            REPORT #: [id number]      
PHYSICIAN:        MATTY DOW MD               
PCP:              NO PRIMARY CARE PHYSICIAN     
REPORT IS CONFIDENTIAL AND NOT TO BE RELEASED WITHOUT AUTHORIZATION
 
 
                                  [facility]
                                    [address], [place]  [address]
_________________________________________________________________________________________
                                                                 Signed   
 
 
underwent a CT scan, which revealed an approximately 1 to 2 mm obstructing left
ureterovesical junction calculus.  No other stones were noted on the CT scan.  He was
immediately admitted and given IV fluid resuscitation for his urosepsis, along with IV
antibiotics.  He has been n.p.o. since last night and he now presents today to undergo
emergent surgery in the form of ureteral stone extraction with left ureteral stent
insertion. 
 
OPERATIVE FINDINGS:
1. On cystoscopy, there was no evidence of any suspicious masses, lesions, or stones.
Bilateral ureteral orifices are in their normal anatomic location.  The left ureteral
orifice was noted to be somewhat swollen. 
2. Semi-rigid ureteroscopy revealed the presence of a very small stone in the intramural
portion of the distal left ureter.  The stone was extracted using a Zero tip basket
without difficulty and placed in a specimen cup for analysis. 
3. A 6 x 26 cm double-J ureteral stent was inserted into the left ureter under direct
visualization without difficulty. 
 
DESCRIPTION OF PROCEDURE:
After informed consent was obtained, the patient was taken back to the operating room.
He was transferred from the [facility] to the operating room table, where general anesthesia
was induced.  He was placed in the dorsal lithotomy position and his genitalia were
prepped and draped in a standard sterile fashion.  Using a 30-degree lens on a
22.5-Lebanese introducer, rigid cystoscope was inserted through his urethra into his
bladder under direct visualization.  Panendoscopic views of the bladder and urethra were
then obtained.  Please see above findings.  Attention was turned to the left ureteral
orifice. With the help of a guidewire, a semi-rigid ureteroscope was passed into the
distal left ureter.  I immediately noted the 2 mm stone which was extracted using a Zero
tip basket without difficulty.  The stone was removed from the bladder and placed in a
specimen cup.  I then reinserted the semi-rigid ureteroscope and evaluated the distal
left ureter.  There were no remaining stones or fragments present.  I then passed a
0.035 Sensor wire through the scope and into the left ureter.  Adequate placement of the
wire was confirmed on fluoroscopy.  The semi-rigid scope was then removed and a 6 x 26
cm double-J ureteral stent was passed into the left collecting system over the
indwelling Sensor wire without difficulty.  Once the wire was pulled, an adequate coil
was seen within the left renal pelvis along with an adequate distal coil noted in the
bladder via cystoscopy.  The patient then underwent placement of another 16-Lebanese
indwelling Palomino catheter.  The procedure was then terminated.  The patient tolerated
the procedure well without any complication.  He will now be transferred to the
postanesthesia care unit in stable condition. 
 
DISPOSITION:
The patient will return to the ICU and will continue IV fluid resuscitation and IV
 
    Electronically Signed By: MATTY DOW MD  12/14/21 1904
_________________________________________________________________________________________
PATIENT NAME:     AMOS DIALLO                     
MEDICAL RECORD #: [id number]            OPERATIVE REPORT              
          ACCT #: [id number]  
DATE OF BIRTH:   [birth year]            REPORT #: [id number]      
PHYSICIAN:        MATTY DOW MD               
PCP:              NO PRIMARY CARE PHYSICIAN     
REPORT IS CONFIDENTIAL AND NOT TO BE RELEASED WITHOUT AUTHORIZATION
 
 
                                  [address]  [address]
_________________________________________________________________________________________
                                                                 Signed   
 
 
antibiotics.  His both blood cultures and urine cultures are still pending.  I have
asked the hospitalist to make sure the patient goes home on at least 10 days of culture
directed oral antibiotics.  He has also already been placed on our clinic schedule to
undergo cystoscopy with left ureteral stent extraction on December 30, 2021. 
 
 
 
            ________________________________________
            MD VIKI Anand/YVETTE
Job #:  [id number]
DD:  12/14/2021 12:52:54
DT:  12/14/2021 18:47:32
 
 
Copies:                                
~
 
 
 
 
 
 
 
 
 
 
 
 
 
 
 
 
 
 
 
 
 
 
 
 
    Electronically Signed By: MATTY DOW MD  12/14/21 1904
_________________________________________________________________________________________
PATIENT NAME:     AMOS DIALLO                     
MEDICAL RECORD #: [id number]            OPERATIVE REPORT              
          ACCT #: [id number]  
DATE OF BIRTH:   [birth year]            REPORT #: [id number]      
PHYSICIAN:        MATTY DOW MD               
PCP:              NO PRIMARY CARE PHYSICIAN     
REPORT IS CONFIDENTIAL AND NOT TO BE RELEASED WITHOUT AUTHORIZATION

## 2022-02-07 NOTE — XMS
PreManage Notification: AMOS PEACOCK MRN:[id number]
 
Security Information
 
Security Events
1 event(s) in the past 18 months
Most recent security events:
 
Elopement at [facility] 08/28/2021 18:13
  -    Other
Details:
    PATIENT LEFT AMA.
 
 
 
CRITERIA MET
------------
- Group Notification
- [facility]
 
 
CARE PROVIDERS
-------------------------------------------------------------------------------------
[facility]     [facility]
 
PHONE: [phone]
-------------------------------------------------------------------------------------
 
[facility] has no Care Guidelines for this patient.
Care History
Medical/Surgical
08/30/2021    [facility]
 
      - RECVD CASE MANAGEMENT CONSULT-TO HELP PATIENT ESTABLISH WITH A PCP IN THE 
      AREA.
      - [facility] CALLED PATIENT 2X- NO ANSWER- VOICEMAIL BOX HAS NOT BEEN SET UP TO LEAVE 
      A MESSAGE.
      - NO PCP LETTER SENT TO PATIENT- WITH CLINICS LISTED IN THE AREA.
E.D. VISIT COUNT (12 MO.)
-------------------------------------------------------------------------------------
3 [address]
 
6 [facility]
-------------------------------------------------------------------------------------
TOTAL 10
-------------------------------------------------------------------------------------
NOTE: Visits indicate total known visits.
 
ED/C VISIT TRACKING (12 MO.)
-------------------------------------------------------------------------------------
02/07/2022 17:26
[facility]
 
TYPE: Emergency
 
COMPLAINT:
- SHORTNESS OF BREATH
 
-------------------------------------------------------------------------------------
12/10/2021 09:13
[facility]
 
TYPE: Emergency
 
COMPLAINT:
- FELL OUT OF WHEELCHAIR
 
DIAGNOSES:
- Pain in left knee
- Other long term (current) drug therapy
- Fall from non-moving wheelchair, initial encounter
- Contusion of left hip, initial encounter
- Chronic obstructive pulmonary disease, unspecified
- Nicotine dependence, unspecified, uncomplicated
-------------------------------------------------------------------------------------
09/05/2021 11:05
[facility]
 
TYPE: Emergency
 
COMPLAINT:
- WEAKNESS
-------------------------------------------------------------------------------------
08/28/2021 18:13
[facility]
 
TYPE: Emergency
 
COMPLAINT:
- LACERATION
 
DIAGNOSES:
- Tachycardia, unspecified
- Nicotine dependence, unspecified, uncomplicated
- Chronic obstructive pulmonary disease, unspecified
- Contact with other sharp object(s), not elsewhere classified, initial encounter
- Laceration without foreign body, right lower leg, initial encounter
-------------------------------------------------------------------------------------
08/25/2021 17:41
[facility]
 
TYPE: Emergency
 
COMPLAINT:
- ABD PAIN
 
DIAGNOSES:
- Other stimulant abuse, uncomplicated
- Unspecified abdominal pain
- Patient's noncompliance with other medical treatment and regimen
- Nicotine dependence, unspecified, uncomplicated
- Chronic obstructive pulmonary disease, unspecified
-------------------------------------------------------------------------------------
07/21/2021 18:02
[facility]
 
TYPE: Emergency
 
 
DIAGNOSES:
- Pneumonia, unspecified organism
- Hypoxemia
- EMS
- Acute respiratory failure with hypoxia
-------------------------------------------------------------------------------------
06/28/2021 06:52
[facility]
 
TYPE: Emergency
 
COMPLAINT:
- WEAKNESS
 
DIAGNOSES:
- Other symptoms and signs involving the musculoskeletal system
- Other intervertebral disc degeneration, thoracic region
- Nicotine dependence, unspecified, uncomplicated
- Chronic obstructive pulmonary disease, unspecified
- Weakness
- Hematuria, unspecified
-------------------------------------------------------------------------------------
[facility]
 
DIAGNOSES:
- Slow transit constipation
- Pleurodynia
- Back Pain
- Rib Pain
-------------------------------------------------------------------------------------
06[facility]25/2021 13:07
[facility]
 
[facility]
 
DIAGNOSES:
- Rib Pain
- poss lt side rib pain
- Procedure and treatment not carried out due to patient leaving prior to being seen
by health care provider
-------------------------------------------------------------------------------------
06/18/2021 17:12
[facility]
 
TYPE: Emergency
 
DIAGNOSES:
- Constipation
- Constipation, unspecified
- Constipation 3WKS!!!
-------------------------------------------------------------------------------------
 
 
INPATIENT VISIT TRACKING (12 MO.)
-------------------------------------------------------------------------------------
12/18/2021 15:01
 
[facility]
 
TYPE: Observation
 
COMPLAINT:
- BACTEREMIA
 
DIAGNOSES:
- Paraplegia, unspecified
- Other staphylococcus as the cause of diseases classified elsewhere
- Personal history of urinary (tract) infections
- Bacteremia
- Exposure to other specified factors, sequela
- Essential (primary) hypertension
- Unspecified injury to unspecified level of lumbar spinal cord, sequela
- Other psychoactive substance dependence, in remission
- Schizophrenia, unspecified
- Presence of urogenital implants
- Chronic obstructive pulmonary disease, unspecified
- Nicotine dependence, cigarettes, uncomplicated
-------------------------------------------------------------------------------------
12/14/2021 10:37
[facility]
 
TYPE: Medical Surgical
 
COMPLAINT:
- UTI
 
DIAGNOSES:
- Patient's other noncompliance with medication regimen
- Severe sepsis without septic shock
- Streptococcal sepsis, unspecified
- Other stimulant abuse, uncomplicated
- Acute kidney failure, unspecified
- Chronic obstructive pulmonary disease, unspecified
- Chronic obstructive pulmonary disease, unspecified
- Streptococcal sepsis, unspecified
- Pyonephrosis
- Patient's other noncompliance with medication regimen
- Schizophrenia, unspecified
- Other stimulant abuse, uncomplicated
- Essential (primary) hypertension
- Essential (primary) hypertension
- Dependence on wheelchair
- Severe sepsis without septic shock
- Schizophrenia, unspecified
- Paraplegia, unspecified
- Pyonephrosis
- Urinary tract infection, site not specified
- Paraplegia, unspecified
- Acute kidney failure, unspecified
- Dependence on wheelchair
-------------------------------------------------------------------------------------
09/06/2021 14:05
[facility]
 
TYPE: Medical Surgical
 
COMPLAINT:
 
- FALL/DEHYDRATION/SVT
 
DIAGNOSES:
- Other specified postprocedural states
- Cellulitis of left lower limb
- Other stimulant use, unspecified, uncomplicated
- Pneumonia, unspecified organism
- Dependence on wheelchair
- Supraventricular tachycardia
- Cellulitis of right lower limb
- Venous insufficiency (chronic) (peripheral)
- Dehydration
- Homelessness
- Schizophrenia, unspecified
- Dependence on wheelchair
- Homelessness
- Supraventricular tachycardia
- Schizophrenia, unspecified
- Venous insufficiency (chronic) (peripheral)
- Unspecified fall, initial encounter
- Other specified postprocedural states
- Rhabdomyolysis
- Weakness
- Unspecified fall, initial encounter
- Paraplegia, unspecified
- Paraplegia, unspecified
- Rhabdomyolysis
- Hypokalemia
- Dehydration
- Cannabis use, unspecified, uncomplicated
- Chronic obstructive pulmonary disease, unspecified
- Other stimulant use, unspecified, uncomplicated
- Cannabis use, unspecified, uncomplicated
- Chronic obstructive pulmonary disease, unspecified
- Hypokalemia
- Nicotine dependence, cigarettes, uncomplicated
- Nicotine dependence, cigarettes, uncomplicated
-------------------------------------------------------------------------------------
07/21/2021 18:02
[facility]
Center
 
TYPE: General Medicine
 
DIAGNOSES:
- Acute respiratory failure with hypoxia
- Pneumonia, unspecified organism
-------------------------------------------------------------------------------------
06/28/2021 16:20
[facility]
Center
 
TYPE: Surgery
 
DIAGNOSES:
- Intraspinal abscess and granuloma
- T7 osteomyelitis with lower extremity paralysis
- Osteomyelitis, unspecified
-------------------------------------------------------------------------------------
 
 
[email or web address]

## 2022-05-23 NOTE — NUR
PT LAYING IN BED SLEEPING AT THIS TIME ON 2L O2 NC. IVF, IV ABX, AND IV
PROTONIX INFUSING. PT AWOKE EASILY BUT WAS DROWSY, PT ORIENTED X4 AND REPORTED
NO NEEDS. VITALS ASSESSED AND ASSESSMENT COMPLETED (SEE CHART). PT HEART RATE
TACHYCARDIC 110'S, LUNGS CLEAR IN UPPER LOBES AND DIMINSHED/CLEAR IN THE
BASES, UNPRODUCTIVE COUGH NOTED. ABDOMEN SOFT, NO PAIN UPON PALPATION, ACTIVE
BOWEL TONES HEARD. PULSES STRONG, CAPILLARY REFILL BRISK. TRIPLE LUMEN IJ SITE
C/D/I, LUMENS FLUSH EASILY AND RETURN BLOOD. AVENDANO DRAINING CLEAR YELLOW URINE
AND WAS EMPTIED OF 500ML. NEW BAG OF LR STARTED AND INFUSING AT ORDERED RATE
(SEE MAR). UNUSED LUMEN ON CENTRAL LINE HEPARIN LOCKED (SEE MAR). PT THEN
REPOSITIONED ONTO HIS LEFT SIDE, PILLOWS PLACED UNDERNEATH. PT REPORTS NO
FURTHER NEEDS AT THIS TIME, CALL LIGHT WITHIN REACH, WILL CONTINUE PLAN OF
CARE.

## 2022-05-23 NOTE — NUR
IN TO CHECK ON PT AND DO ASSESSMENT. ASSESSMENT UNCHANGED FROM PRIOR. WHEN
ASKED IF HE IS USUALLY THIS SLEEPY OR IF THIS IS FROM BEING SICK HE STATES
"NO, IM NOT, THIS IS FROM BEING SICK". REPOSITIONED, CALL LIGHT IN HAND AND PT
BACK TO SLEEP.

## 2022-05-23 NOTE — NUR
CENTRAL LINE DRESSING CHANGE DONE BY KRISS CROSS. PT AWAKENED BRIEFLY THEN WENT
BACK TO SLEEP. HAS CONTINUED TO SLEEP THROUGH THE AFTERNOON AND THIS EVENING.

## 2022-05-23 NOTE — NUR
PT REMAINS SLEEPING, IV ABX COMPLETED, LUMEN SALINE LOCKED AT THIS TIME ON
CENTRAL LINE. PT REPORTS NO NEEDS, REMAINS ON 2L O2 NC, SPO2 94%. WILL
CONTINUE PLAN OF CARE.

## 2022-05-23 NOTE — NUR
CALL LIGHT USED BY PT. PT AWAKE IN BED ON 2L O2 NC. PT REQUESTED PILLOWS TO BE
REMOVED FROM HIS RIGHT SIDE TO REPOSITION ONTO HIS BACK. PILLOWS REMOVED, PT
ASSISTED IN REPOSITIONING ONTO HIS BACK. HOB ELEVATED, EXTREMITIES ELEVATED
WITH PILLOWS. PT REPORTED NO FURTHER NEEDS AND RETURNED BACK TO SLEEP. CALL
LIGHT IN REACH, BED IN LOWEST POSITION, IVF AND IV PROTONIX INFUSING AS
ORDERED, WILL CONTINUE PLAN OF CARE.

## 2022-05-23 NOTE — NUR
CT RESULTS READ TO  VIA PHONE. ORDERS FOR IV ABX TO START NOW. PATIENT
UPDATE PROVIDED; VS, OUTPUT, AND ASSESSMENT FINDINGS.

## 2022-05-23 NOTE — NUR
PT NOTED TO DESTURATE ON 2L O2 NC TO 80% WHILE SLEEPING. PT AWOKE EASILY, TOOK
DEEP BREATHES VIA NOSE, AND SPO2 INCREASED %. PT NOTED TO BREATH VIA
MOUTH WHILE SLEEPING, PT CHANGED TO 2L O2 VIA OXYMASK. PT REPORTS NO FURTHER
NEEDS WHEN ASKED AND RETURNED TO SLEEP. IVF AND IV PROTONIX INFUSING, WILL
CONTINUE PLAN OF CARE. CALL LIGHT WITHIN REACH.

## 2022-05-23 NOTE — NUR
AVENDANO EMPTIED. PATIENT HAS ADEQUATE URINE OUTPUT. PATIENT REPOSITIONED.
PATIENT APPEARS IRRITABLE AND CONTINUES TO BE DROWSY. 2L NC IN PLACE, O2 SATS
95%, RR 22-24. LUNG OSUNDS ARE CLEAR AND DIM IN THE BASES. LOOSE
NON-PRODUCTIVE COUGH, PATIENT REPORTS AS CHRONIC FROM SMOKING. ABD SOFT,
TENDER WITH PALPATION. NO NAUSEA. BOWEL SOUNDS ACTIVE.

## 2022-05-23 NOTE — NUR
DISCUSSED LAB FINDINGS AND PATIENT STATUS WITH . PLAN TO GO TO CT FOR
ABD/PELVIS SCAN. DISCUSSED WITH IMAGING STAFF. PATIENT TAKEN BY THIS RN TO CT
SUIT. PATIEN TOLERATED WELL. NO CONCERNS.

## 2022-05-23 NOTE — NUR
PLAN OF CARE DISCUSSED WITH  WHILE INTO SEE THE PATIENT. PATIENT IS
FREQUENTLY DROWSY, ORIENTED TO SELF AND SURROUNDINGS WHEN AWAKE. VS STABLE. HR
ELEVATED 125-130. TOLERATING ROOM AIR. PATIENT REPORTS FEELING COLDS, AXILLARY
AND ORAL TEMP WNL. CENTRAL LINE DRESSING RE-ENFORCED AND CLAVES ALL CHANGED.
LUMENS CLEANED WITH ALCOHOL. ORANGE CAPS ON UNUSED HUBS. IV BOLUS 1 OF 2
INFUSING. AVENDANO PLACED, 325 MLS OF CLEAR URINE OUT INITIALLY. PATIENT
PREVIOUSLY INCONTINENT OF URINE X1.

## 2022-05-23 NOTE — NUR
IN TO PATIENT ROOM, PATIENT IN BED RESTING. PATIENT STATES HE LIVES AT HOME
WITH HIS WIFE AND FAMILY. PATIENT DOES USE A WC FOR MOBILITY, BUT IT IS BROKEN
AT THIS TIME. PATIENT STATES HE WILL BE ABLE TO FIX HIS WC. PATIENT DOES NOT
HAVE A SHOWER CHAIR AND FEELS IT WOULD HELP. WHEN INQUIERING ABOUT FINANCIAL
NEEDS SUCH AS [employer] AND THE FOOD BANK, PATIENT STATES "I NEED IT ALL."
OFFERED TO REFER PATIENT TO MSW AT DISCHARGE TO HELP HIM AND HIS WIFE, BUT
PATIENT SHAKES HIS HEAD NO AND STATES "I DON'T WANT ANYONE DIGGING AROUND IN
MY BUSINESS." PAMPHLET GIVEN FOR LOCAL RESOUCES. WILL CONTINUE TO F/U WITH
PATIENT DURING HIS VISIT.

## 2022-05-23 NOTE — NUR
PT GIVEN BED BATH AND LINEN CHANGE, ATTENDS CLEAN. PT HAS HAD NO STOOL SO FAR
THIS SHIFT. URINE OUTPUT CONTINUES TO BE GOOD. HR 1101-20'S RESTING. PT MORE
AWAKE AFTER BATH GIVEN, COOPERATIVE AND FRIENDLY. IVF CONTINUE AT 150ML/HR AND
PROTONIX IS AT 27ML/HR.

## 2022-05-23 NOTE — NUR
PT REPOISITIONED, DENIES NEEDS. DR BANDA IN TO SEE PT AND DISCUSS PLAN FOR
POSSIBLE EGD TONIGHT OR TOMORROW.

## 2022-05-23 NOTE — NUR
REPORT RECEIVED FROM JOHNNY CROSS. IN TO CHECK ON PT, PT WAS SLEEPING BUT AWAKENS
EASILY. DENIES PAIN OR NEEDS. IVF INFUSING AT 150ML/HR AND PROTONIX GTT
INFUSING AT 27ML/HR. BP'S CONT TO BE LOW, 79/44 (54). MANUAL DONE 74/40.
RESTING HR CONTINUES 'S. URINE OUTPUT FOR THE LAST HOUR WAS 75ML. PT
ALERT AND ORIENTED ANSWERING QUESTIONS APPROPRIATLEY. CALL LIGHT IN HAND.

## 2022-05-23 NOTE — NUR
PT GIVEN BED BATH AND LINEN CHANGE, ATTENDS CLEAN. PT HAS HAD NO STOOL SO FAR
THIS SHIFT. URINE OUTPUT CONTINUES TO BE GOOD. HR 1101-20'S RESTING. PT MORE
AWAKE AFTER BATH GIVEN, COOPERATIVE AND FRIENDLY.

## 2022-05-23 NOTE — NUR
DRESSING OVER CENTRAL LINE TARIQ ENGLE, PTS RN, REQUESTS ASSITANCE WITH
DRESSING CHANGE.
CENTRAL LINE DRESSING CHANGED PERFORMED BY THIS RN PER PROTOCOL. PT TOLERATED
PROCEEDURE WELL. NO ADDITIONAL NEEDS AT THIS TIME.

## 2022-05-23 NOTE — NUR
IV BOLUS FINSIHED. LABS DRAWN. PATIENT RESTING. WAKES TO STRONG VERBAL
STIMULI, ORIENTED BUT LETHARGIC. Sp02 89-91% WITH GOOD PLETH; 2L NC PLACED.
PATIENT HAS GOOD URINE OUTPUT. HEEL PROTECTORS IN PLACED. REPOSITIONED
PATIENT. HOB ELEVATED 30 DEGREES.

## 2022-05-24 NOTE — NUR
RECHECKED BLOOD SUGAR, NOW READS 110. PT IS STILL SLEEPING/DROWSY.
REPOSITIONED PT ONTO LEFT SIDE WITH PILLOWS SUPPORTING UNDER BACK. CALL LIGHT
IS WITHIN REACH, WILL CONTINUE TO MONITOR.

## 2022-05-24 NOTE — NUR
PT LAYING IN BED SLEEPING AT THIS TIME ON 2.5L O2 OXYMASK. IV ABX COMPLETED,
PT'S LUMEN SALINE LOCKED. PT AWOKE BRIEFLY DURING THIS TIME AND WAS STILL
DROWSY. PT REPORTED NO NEEDS AND RETURNED TO SLEEP. CALL LIGHT IN REACH, WILL
CONTINUE PLAN OF CARE.

## 2022-05-24 NOTE — NUR
PT LAYING IN BED AWAKE ON 2.5L O2 OXYMASK, PT NOTED TO STILL BE DROWSY AT THIS
TIME. NEW BAG OF IVF AND SCHEDULED IV ABX STARTED AND INFUSING AT ORDERED
RATES (SEE MAR). IV FLUIDS/MEDICATIONS MOMENTARILY STOPPED, SCHEDULED LABS
DRAWN AFTER INITIAL WASTE FROM PT'S CENTRAL LINE. CENTRAL LINE FLUSHED WITH
SALINE, IVF, IV PROTONIX, AND IV ABX RESUMED. PT REPORTS NO FURTHER NEEDS AT
THIS TIME AND REMAINS RESTING IN BED, AVENDANO DRAINING YELLOW URINE. LAB DRAWS
SENT TO LAB. CALL LIGHT WITHIN PT'S REACH, WILL CONTINUE PLAN OF CARE.

## 2022-05-24 NOTE — NUR
Pt calls to use urinal, requests some time to finish and states he will call
the nurses station when he is done/if he needs assistance.

## 2022-05-24 NOTE — NUR
PT LAYING IN BED SLEEPING ON 2.5L O2 OXYMASK. IVF AND IV PROTONIX INFUSING AS
ORDERED. PT IN NO APPARENT DISTRESS AT THIS TIME, RESPIRATIONS NOTED. PT LEFT
UNDISTURBED, NO FURTHER NEEDS ASSESSED AT THIS TIME, WILL CONTINUE PLAN OF
CARE.

## 2022-05-24 NOTE — NUR
Pt arrives to Med surg room 123 from CCU. Pt A+O, able to answer questions and
states no needs. IVF infusing WNL. Lungs coarse, pt states daily smoker,
nicotine patch to R shoulder. Pt states no pain at this time. R IJ WNL,
dressing intact. Pt belongings collected and pt oriented to room/unit. VSS.
Call light in reach

## 2022-05-24 NOTE — NUR
IN PT ROOM FOR MORNING ASSESSMENT. PT IS SLEEPING AND AWOKEN WITH VERBAL CUE.
PT IS ORIENTED BUT NOT TO DATE. PT IS NOT EXPERIENCING ANY PAIN. BOWEL TONES
ACTIVE X 4, NO DISTENTION, OR WINCING NOTICED WITH PALPATION. PULSES ARE
STRONG RADIALLY, PULSES FOUND PEDAL WITH DOPPLER. PT EXPERIENCING OCCASIONAL
PVC'S. SKIN IS WARM/DRY/SCALEY. GENERALIZED EDEMA BILATERALLY ON LOWER
EXTREMETIES, 1+. CONTINUOUS FLUID RUNNING WITH PANTOPRAZOLE IN DISTAL IJ, ALL
OTHERS ARE SALINE LOCKED. CALL LIGHT WITHIN REACH, WILL CONTINUE TO MONITOR.

## 2022-05-24 NOTE — NUR
PT LAYING IN BED SLEEPING, OXYMASK IN PLACE ON 2.5L O2. IVF AND IV PROTONIX
INFUSING AT ORDERED RATES. PT AWOKE EASILY AND WAS STILL DROWSY BUT ORIENTED
X4. PT REPORTS NO PAIN AND DENIED SHORTNESS OF BREATH WHEN ASKED. PT REQUESTED
THE TV REMOTE TO WATCH TV AT THIS TIME. REMOTE PROVIDED TO PT. VITALS THEN
TAKEN AND ASSESSMENT COMPLETED (SEE CHART). PT LUNGS CLEAR IN UPPER LOBES
BILATERALLY, CLEAR/DIM IN RIGHT LOWER BASE AND EXPIRATORY WHEEZES IN THE LEFT
LOWER BASE. ACTIVE BOWEL TONES PRESENT, ABDOMEN REMAINS SOFT. HEART RATE
REMAINS TACHYCARDIC. PT REPORTS NO FURTHER NEEDS AFTERWARDS. IV ABX STARTED
AND NOW INFUSING AT ORDERED RATE, CALL LIGHT IN REACH, BED IN LOWEST POSITION,
WILL CONTINUE PLAN OF CARE.

## 2022-05-24 NOTE — NUR
RECEIVED REPORT FROM DAYSHIFT NURSE. PATIENT ENCOURAGED TO DRINK BOWEL PREP.
URINAL EMPTIED AND RECORDED. NO FURTHER NEEDS NOTED. CALL LIGHT WITHIN REACH.

## 2022-05-24 NOTE — NUR
IV ABX COMPLETED, PT CENTRAL LINE SALINE LOCKED AT THIS TIME. PT REMAINS
ASLEEP ON 2.5L O2 OXYMASK. IV ABX AND IV PROTONIX INFUSING AS ORDERED. NO
NEEDS ASSESSED AT THIS ITME, CALL LIGHT IN REACH, BED IN LOWEST POSITION, WILL
CONTINUE PLAN OF CARE.

## 2022-05-24 NOTE — NUR
PATIENT HAD LARGE LOOSE BM. BEDDING CHANGED. PATIENT TOLERATED ACTIVITY WELL.
PATIENT EDUCATED AND ENCOURAGED TO FINISH BOWEL PREP. PATIENT VERBALIZES
UNDERSTANDING. NO FURTHER NEEDS NOTED. CALL LIGHT IN REACH.

## 2022-05-24 NOTE — NUR
RECEIVED MORNING REPORT AND IN ROOM TO SEE PT. PT IS SLEEPING IN BED WITH
OXYMASK ON AT 2.5 L AND O2 AT 99%. FLUSHED IJ WITH 20 ML IN PROXIMAL, 15 IN
MEDIAL, AND 5 ML HEPARIN. IV ON RT WRIST FLUSHED WELL AND IS SALINE LOCKED.
.9 MG/MIN PANTOPRAZOLE RUNNING AT 27 ML/HR CONCURRENTLY WITH LR RUNNING 
ML/HR INTO DISTAL LINE OF IJ. PT DOES NOT REPORT ANY PAIN THIS MORNING AND IS
DROWSY, BUT ORIENTED. WILL CONTINUE TO MONITOR.

## 2022-05-24 NOTE — NUR
STOPPED INTO PT ROOM NOTED HE HAD URINAL TO EMPTY, HAS FINISHED ONE OF 2
BOTTLES OF BOWEL PREP.  STATES HE WILL START THE SECOND.  REINFORCED RIGHT IJ
AS DRESSING WAS PULLING UP.  NO OTHER NEEDS AT THIS TIME.

## 2022-05-24 NOTE — NUR
PT IS AWAKE AND ALERT WHILE INPUTTING VITALS. WIFE IS NO LONGER IN ROOM. PT
REPOSITIONED TO BACK, HEEL PROTECTORS PUT BACK IN PLACE. PT STILL DROWSY AND
FREQUENTLY FALLING BACK ASLEEP. PT NOW DRINKING WATER DUE TO PROVIDER ORDERS
UPDATE FROM NPO TO CLEAR LIQUIDS. FLUIDS STILL INFUSING INTO CENTRAL LINE IJ.
CALL LIGHT IS WITHIN REACH, WILL CONTINUE TO MONITOR.

## 2022-05-24 NOTE — NUR
PATIENT ASSISTED TO TRANSFER TO BSC. PATIENT HAVING BM. PATIENT IS NOW NPO.
PATIENT COMPLETED BOWEL PREP. PATIENT IS ON BSC. PATIENT GIVEN PRIVACY. CALL
LIGHT IN REACH.

## 2022-05-24 NOTE — NUR
ROUNDED ON PT, PT RESTING IN BED WITH EYES CLOSED. BREATHING EQUAL AND
UNLABORED. CALL LIGHT IN REACH. NO NEEDS IDENTIFIED AT THIS TIME

## 2022-05-24 NOTE — NUR
PT IS LAYING IN BED RESTING. I&O AND VS CHARTED. CALL LIGHT WITHIN REACH. NO
FURTHER TASKS AT THIS TIME

## 2022-05-24 NOTE — NUR
ASSESSMENT COMPLETED. VITALS TAKE & RECORDED. I/O'S RECORDED. PATIENT DRANK 1
BOTTLE OF BOWEL PREP. SCHEDULED MEDS GIVEN PER ORDER. IV INFUSING PER ORDER.
PATIENT DENIES PAIN OR NAUSEA. IJ FLUSHED & HEP LOCKED IN 2 LUMENS. NO FURTHER
NEEDS NOTED. CALL LIGHT IN REACH.

## 2022-05-24 NOTE — NUR
PT DROWSY AND HAVING ISSUES STAYING AWAKE DURING ASSESSMENT. BLOOD GLUCOSE WAS
68. MD NOTIFIED. ORDERS RECIEVED.

## 2022-05-24 NOTE — EKG
[facility]
                                    [address]
                                  [place]  [address]
_________________________________________________________________________________________
                                                                 Signed   
 
 
Sinus tachycardia with occasional premature ventricular complexes
Left axis deviation
Right bundle branch block
Abnormal ECG
When compared with ECG of 05-SEP-2021 11:17,
premature ventricular complexes are now present
premature supraventricular complexes are no longer present
Confirmed by DAPHNEY MILLER MD ([id number]) on 5/24/2022 1:39:12 PM
 
 
 
 
 
 
 
 
 
 
 
 
 
 
 
 
 
 
 
 
 
 
 
 
 
 
 
 
 
 
 
 
 
 
 
 
 
    Electronically Signed By: DAPHNEY MILLER MD  05/24/22 1339
_________________________________________________________________________________________
PATIENT NAME:     AMOS PEACOCK                     
MEDICAL RECORD #: [id number]                     Electrocardiogram             
          ACCT #: [id number]  
DATE OF BIRTH:   [birth year]                                       
PHYSICIAN:   DAPHNEY MILLER MD           REPORT #: [id number]
REPORT IS CONFIDENTIAL AND NOT TO BE RELEASED WITHOUT AUTHORIZATION

## 2022-05-24 NOTE — NUR
PT SLEEPING IN BED ON 2.5L O2 OXYMASK. IVF AND IV PROTONIX INFUSING AS
ORDERED. PT AWOKE EASILY AND WAS DROWSY BUT ORIENTED. VITALS TAKEN AND
ASSESSMENT COMPLETED (SEE CHART). PT LUNGS COARSE THROUGHOUT AT THIS TIME, PT
DENIES SHORTNESS OF BREATH, HEART RYTHM REGULAR AND TACHYCARDIC, ABDOMEN SOFT,
ACTIVE, PT DENIES DISCOMFORT, PAIN, OR NAUSEA. PULSES STRONG, CAP REFILL
BRISK. PT STILL HAS GARBLED SPEECH WHEN ANSWERING QUESTIONS AT TIMES. LOWER
EXTREMITIES REPOSITIONED AT THIS TIME AND ELEVATED, PT REPORTS NO FURTHER
NEEDS WHEN ASKED AND RETURNED BACK TO SLEEP. CALL LIGHT WITHIN REACH.

## 2022-05-24 NOTE — NUR
RECEIVED DISCHARGE ORDERS AND DC'ED CARDIAC MONITOR AND CATHETER. PT TOLERATED
WELL. PT IS A[facility] AND [facility] BOW[facility] PREP,
STATES HE IS ABLE TO USE URINAL AND BEDSIDE COMMODE FOR BOWEL MOVEMENTS. NEW
BAG OF PROTONIX INFUSING IN IJ AND ANTIBIOTIC NOW INFUSING IN RT WRIST.
PREPARING PT FOR DISCHARGE IN BED.

## 2022-05-24 NOTE — NUR
PT SLEEPING AT THIS TIME. PT REPOSITIONED UP ON THE BED WITH ASSISTANCE FROM
[facility]. PT REQUESTED NOT TO BE TURNED TO HIS SIDES AT THIS TIME STATING HE
WANTS TO BE "LEFT ALONE". EXTREMITIES ELEVATED WITH PILLOWS. PT REPORTS NO
FURTHER NEEDS AND REMAINS ON 2.5L O2 OXYMASK, IVF AND IV PROTONIX INFUSING.
CALL LIGHT IN REACH, BED IN LOWEST POSITION, WILL CONTINUE PLAN OF CARE.

## 2022-05-25 NOTE — NUR
05/25/22 0828 Domitila Akers
0819-PATIENT ARRIVED TO PACU ON 5L NC ORAL AIRWAY IN PLACE
NONAROUSABLE LAYING LEFT LATERAL. ABDOMEN SOFT. SR. IVF INFUSING.
PATIENT PASSING GAS AND LIQUID STOOL
 
0823-PATIENT COUGHING STARTING TO OPEN EYES ORAL AIRWAY REMOVED HOB
ELEVATED.
 
0828-PATIENT AWAKE DENIES PAIN OR NAUSEA. PLACED ON RA 95% RR EVEN.
PATIENT REQUESTED TO WIPE MOUTH TISSUES GIVEN.

## 2022-05-25 NOTE — NUR
Spoke with Jaime. He states he plans on dc to home with family. Will need
transport as they do not have a car.  Will dc to home with his wife Ariane
when he is cleared medically.

## 2022-05-25 NOTE — NUR
In patient room to administer IV ABX. Pt uses urinal to void w/o assistance.
He states no pain or needs at this time. Full liq lunch tray in room. pt
tolerates well at this time. On room air while awake, spo2 100%, requires 1-2L
with sleep to maintain above >93%.

## 2022-05-25 NOTE — NUR
PATIENT ASSISTED TO THE BSC AS A 2PA SLIDE XFER. PATIENT ABLE TO VOID AND HAVE
LARGE LIQUID BM. PATIENT IS BACK IN BED RESTING. PATIENTS IV INFUSING PER
ORDER. PATIENT DENIES ANY FURTHER NEEDS. CALL LIGHT IN REACH.

## 2022-05-25 NOTE — NUR
PT RETURNED FROM PROCEDURE. VSS, 1L NC SPO2 100%. PT A+O, HELPED TRANSFER
HIMSELF BACK TO BED. ASSESMENT COMPLETE. HRR, LUNG SOUNDS COARSE, IV INFUSING
WNL. PT BOWEL TONES ACTIVE, NO NAUSEA AT THIS TIME AND STATES EXPERIENCING
GAS. NO FURTHER NEEDS IDENTIFIED CALL LIGHT IN REACH.

## 2022-05-25 NOTE — NUR
ASSESSMENT COMPLETED. VITALS TAKEN & RECORDED. I/O'S RECORDED. PATIENT DENIES
PAIN. R WRIST IV DC'D, NO LONGER PATENT, TIP INTACT, & NO S/SX OF
INFILTRATION. IJ HEP LOCKED PER ORDER & CAPS CHANGED. SCHEDULED MEDS GIVEN PER
ORDER. PEDAL PULSES HEARD PER DOPPLER. PATIENT PROVIDED ORANGE ICEY & TEA PER
REQUEST, NO FURTHER NEEDS NOTED. CALL LIGHT IN REACH.

## 2022-05-25 NOTE — NUR
ASSESSMENT COMPLETED. VITALS TAKEN & RECORDED. DOPPLER USED TO LOCATE
BILATERL PEDAL PULSES. I/O'S RECORDED. IV ABX INFUSING PER ORDER. IV FLUSHED &
SL. NO FURTHER NEEDS NOTED. CALL LIGHT IN REACH.

## 2022-05-25 NOTE — NUR
ASSISTED PATIENT OFF COMMODE PER 2PA, PATIENT HAD LARGE AMOUNT OF DARK LIQUID
STOOL. PATIENT IN BED RESTING. IV ABX RUNNING AS ORDERED. NO FURTHER NEEDS
NOTED. CALL LIGHT IN REACH.

## 2022-05-25 NOTE — NUR
Scheduled medications administered, pt resting in bed watching tv. A+O, on
room air, states no needs at this time. urinal emptied.

## 2022-05-25 NOTE — NUR
RECEIVED REPORT FROM DAYSHIFT RN. PATIENT IN BED WATCHING TV. NO FURTHER NEEDS
NOTED. CALL LIGHT IN REACH.

## 2022-05-25 NOTE — NUR
ASSESSMENT COMPLETE. PT RESTING IN BED, CONVERSATING WITH STAFF. LUNG SOUNDS
CLEAR, IV INFUSING WNL,A+O. PT STATES NO PAIN OR NAUSEA AT THIS TIME. BOWEL
TONES ACTIVE, SKIN DRY AND INTACT. NO NEEDS IDENTIFIED AT THIS TIME. CALL
LIGHT IN REACH

## 2022-05-25 NOTE — NUR
IN PT ROOM TO ADMINISTER SCHEDULED MEDICATIONS. PT RESTING IN BED WATCHING TV,
AND EATING A SNACK. PT A+O, SPO2 94% RA. NO FURTHER NEEDS AT THIS TIME. CALL
LIGHT IN REACH

## 2022-05-26 NOTE — NUR
PATIENT LAYING IN BED WATCHING TV. URINAL EMPTIED. VITALS TAKEN & RECORDED.
I/O'S RECORDED. NO FURTHER NEEDS NOTED. CALL LIGHT IN REACH.

## 2022-05-26 NOTE — NUR
PT USES CALL LIGHT TO REQUEST HELP CLEANING SELF AND GETTING BACK TO BED FROM
BSC. DARK BROWN LIQUID STOOL MIXED WITH URINE NOTED IN COMMODE. NO FORMED BM
NOTED.
PT BACK TO BED, CALL LIGHT IN REACH. PT DENIES FURTHER NEEDS.

## 2022-05-26 NOTE — NUR
RN IN ROOM WITH STUDENT NURSE TO ASSESS PT. PT RESTING IN BED WITH HOB
ELEVATED UPON ENTRY.
PT NOTED TO HAVE NEW ULCER ON POSTERIOR RIGHT CALF, PICTURES BEING TAKEN AND
ALYVEN APPLIED. SEE WOUND NOTE FOR DETAILS.
PT INCONTINANT OF STOOL AND URINE, PT CLEANED AND NEW LINENS AND CHUX PLACED
UNDERNEATH BY ROLING PT. PT ABLE TO HELP REPOSISION AND ROLL SELF. STOOL NOTED
TO BE BLACK AND TARY. PT WAS NOT AWARE HE HAD HAD BM. DENIES INCONTINANCE AT
HOME.
PT TOELRTING FULL LIQUID BREAKFAST WITHOUT NAUSEA.
DENIES FURTHER NEEDS, CALL LIGHT IN REACH.

## 2022-05-26 NOTE — NUR
RN IN ROOM TO ADMINISTER SCHEDULED MEDICATIONS. PT ASLEEP IN BED - RR EVEN AND
UNLABORED UPON ENTRY.
IJ HEPLOCKED - ALL LUMENS PATENT WITH FLUSH AND PULL BACK. DRESSING INTACT.
PT REPOSISTIONS SELF IN BED USIN TRAPEZE.
315ML URINE NOTED IN URINAL - PT USES INDEPENDENTLY.
CALL LIGHT IN REACH.

## 2022-05-26 NOTE — NUR
RN IN ROOM TO ASSESS PT. PT RESTING AWAKE IN BED UPON ENTRY WATCHING TV.
PT REQUESTS BSC BROUGHT TO BEDSIDE SO HE CAN TRANSFER FOR BM. PT REQUESTS TO
DO THIS INDEPENDENTLY.
ASSESSMENT UNCHANGED FROM PREVIOUS. PT DENIES PAIN, NAUSEA, SOB.
CALL LIGHT IN REACH.

## 2022-05-26 NOTE — NUR
REPORT RECEIVED FROM NIGHT RN - JULITO. PT ASLEEP IN BED ON BACK WITH HOB
ELEVATED - RR EVEN AND UNLABORED. CALL LIGHT IN REACH.

## 2022-05-26 NOTE — NUR
ASSESSMENT COMPLETED.
VITALS TAKEN & RECORDED. I/O'S RECORDED. IJ FLUSHED & SL.
SCHEDULED MEDS GIVEN AS ORDERED. URINAL EMPTIED. PATIENT DENIES PAIN. CLEAN
BEDDING PROVIDED. NO FURTHER NEEDS NOTED. CALL LIGHT IN REACH.

## 2022-05-26 NOTE — NUR
SHIFT REPORT RECEIVED FROM DELMAHIAREN ABDI, pt AWAKE AND RESTING IN BED.
GIRLFRIEND SHANON ALSO IN ROOM, GIRLFRIEND AND pt ASKING FOR WHEELCHAIR SO pt
CAN BE TAKEN INTO BATHROOM TO SHOWER, BOTH EDUCATED pt HAS CENTRAL LINE
DRESSING THAT CANNOT GET WET, FAMILY AND pt THEN CHANGED MIND REGARDING
SHOWER. VERBALIZED INTEREST IN SHOWER ONCE CENTRAL LINE IS REMOVED. NO FURTHER
NEEDS, CALL LIGHT IN REACH. pt ABLE TO TURN SELF IN BED.

## 2022-05-26 NOTE — NUR
Spoke with pt and he is feeling ok today.  Updated I called [email or web address] [facility]
and he does not qualify as he has medicare for free transport.  Pt had states
he has missed Dr.s stovall as he did not have transportation. Gave him the
number for [facility], [facility] and let him know to scheduled 24 hrs prior
and they can transport him in his wc.

## 2022-05-26 NOTE — NUR
IN ROOM FOR SCHEDULED ASSESSMENT, SCHEDULED MEDS GIVEN (SEE EMAR). pt AWAKE
AND RESTING IN BED, DENIES NEED FOR PAIN AND NAUSEA. NEW ALLEVYN TO RIGHT CALF
IN PLACE D/T SORE,
DATED. PREVIOUS DRESSING FELL OFF, SITE CLEANED WITH WOUND CLEANSER.
PHOTOS ALREADY IN CHART FROM PREVIOUS SHIFT. NO DRAINAGE NOTED, APPROX 1 CM IN
DIAMETER. SMALL AMOUNT URINE NOTED UNDER CHUMARCO A OLMEDO TO ASSIST pt.
BRISK BLOOD RETURN NOTED TO ALL 3 LUMENS OF CENTRAL LINE, CENTRAL LINE FLUSHED
AND HEP LCOKED PER POLICY. DRESSING INTACT WITH NEW ALCOHOL CAPS IN PLACE.
pt DECLINED HEEL PROTECTORS. NO FURTHER NEEDS OR CONCERNS. CALL LIGHT IN
REACH.

## 2022-05-26 NOTE — NUR
WOUND ASSESSMENT:
IN FOR BEDSIDE WOUND ASSESSMENT. PT REPORTS FEELING INTACT AT WOUND SITE AND
PAIN IS MINIMAL. PICTURES AND MEASURMENTS TAKEN. WOUND  SIZE 1.6 X 1.2 X 0.1
CM. WOUND IS ROUND. WOUND BASE IS PINK 100% NONGRANULATING. DRAINAGE SEROSANG
AND MINIMAL. EDGES SMOOTH AND TAPERING. ALLEVYN PLACED,. CAN STAY IN PLACE FOR
UP TO 7 DAYS. CHANGE IF COMING OFF OR DRAIANGE INCREASES.

## 2022-05-26 NOTE — PATH
[facility]
                                    [address], [place]  [address]
_________________________________________________________________________________________
                                                                 Signed   
 
 
 
SPECIMEN(S): A DUODENAL BIOPSY
SPECIMEN(S): B ANTRUM BIOPSY
SPECIMEN(S): C DISTAL ESOPHAGEAL BIOPSY
SPECIMEN(S): D DESCENDING/LEFT COLON BIOPSY LESION
SPECIMEN(S): E DISTAL DESCENDING/LEFT COLON BIOPSY
 
SPECIMEN SOURCE:
A. DUODENAL BIOPSY
B. ANTRUM BIOPSY
C. DISTAL ESOPHAGEAL BIOPSY
D. DESCENDING/LEFT COLON BIOPSY LESION
E. DISTAL DESCENDING/LEFT COLON BIOPSY
 
CLINICAL HISTORY:
GI bleeding, ulcerative esophagitis.  Dx: Divertic., stricturing lesion left 
colon.  Inflammatory lesion, second site. 
 
FINAL PATHOLOGIC DIAGNOSIS:
A.  Duodenum, biopsy:
-  Peptic duodenitis.
-  Negative for dysplasia or malignancy.
B.  Stomach, antrum, biopsy:
-  Antral/oxyntic mucosa with minimal chronic, inactive gastritis.
-  Negative for Helicobacter organisms on HE stain.
-  Negative for dysplasia or malignancy.
C.  Esophagus, distal, biopsy:
-  Fragments of ulcer.
-  No intact mucosa for evaluation.
D.  Colon, descending/left, lesion, biopsy:
-  Colonic mucosa with crypt architectural distortion.
-  Negative for granulomas, dysplasia, or malignancy.
-  See comment.
E.  Colon, distal descending, biopsy:
-  Colonic mucosa with foci of changes compatible with ischemic colitis, see 
comment. 
-  Negative for dysplasia or malignancy.
 
COMMENT:
Regarding specimen D: The colonoscopic findings of a stricturing lesion is 
noted. Sections demonstrate several fragments of colonic mucosa with crypt 
architectural distortion, but no ulcerations, 
 
                                                                                    
_________________________________________________________________________________________
PATIENT NAME:     AYUSHAMOS                     
MEDICAL RECORD #: [id number]            PATHOLOGY                     
          ACCT #: [id number]       ACCESSION #: [id number]     
DATE OF BIRTH:   [birth year]            REPORT #: [id number]       
PHYSICIAN:        MEÑO BROWN              
PCP:              QUINTIN ESPINO MD           
REPORT IS CONFIDENTIAL AND NOT TO BE RELEASED WITHOUT AUTHORIZATION
 
 
                                  [facility]
                                    [address], [place]  [address]
_________________________________________________________________________________________
                                                                 Signed   
 
 
active inflammation, basal lymphoplasmacytosis, or granulomas are identified. 
The architectural changes are compatible with chronic injury. 
Regarding specimen E: Sections demonstrate colonic mucosa with foci of active 
inflammation, epithelial injury, loss of mucin, and lamina propria 
hyalinization.  Focal mucosal necrosis is identified. 
These findings are compatible with ischemic colitis, however they are not 
specific and the differential diagnosis also includes infectious etiologies.  
Clinical correlation is required. 
NAL:cml:C2NR
 
MICROSCOPIC EXAMINATION:
Histologic sections of all submitted blocks are examined by light microscopy.  
These findings, together with the gross examination, support the pathologic 
diagnosis. 
Regarding specimen E:  A pan cytokeratin immunohistochemical stain (with 
appropriately staining controls) is negative for infiltrative carcinoma. 
 
GROSS DESCRIPTION:
Five specimens are received in five containers, labeled "SR."
A.  The specimen, labeled "SR, duodenum biopsy," is received in formalin and 
consists of three tan soft tissue fragments that measure 0.1-0.2 cm in greatest 
dimension.  The specimen is entirely 
submitted in cassette (A1).
B.  The specimen, labeled "SR, antrum biopsy," is received in formalin and 
consists of two tan soft tissue fragments that measure 0.1-0.2 cm in greatest 
dimension.  The specimen is entirely submitted 
in cassette (B1).
C.  The specimen, labeled "SR, distal esophagus biopsy," is received in 
formalin and consists of nine tan soft tissue fragments that measure 0.1-0.2 cm 
in greatest dimension.  The specimen is entirely 
submitted in cassette (C1).
D.  The specimen, labeled "SR, descending colon lesion biopsy," is received in 
formalin and consists of five tan soft tissue fragments that measure 0.1-0.2 cm 
in greatest dimension.  The specimen is 
entirely submitted in cassette (D1).
 
E.  The specimen, labeled "SR, distal descending colon biopsy," is received in 
formalin and consists of three tan soft tissue fragments that measure 0.2 cm in 
greatest dimension.  The specimen is 
entirely submitted in cassette (E1).
JS (under the direct supervision of a pathologist)
The Gross Description was prepared using a voice recognition system. The report 
 
                                                                                    
_________________________________________________________________________________________
PATIENT NAME:     AMOS PEACOCK                     
MEDICAL RECORD #: [id number]            PATHOLOGY                     
          ACCT #: [id number]       ACCESSION #: [id number]     
DATE OF BIRTH:   [birth year]            REPORT #: [id number]       
PHYSICIAN:        MEÑO PATHOLOGY              
PCP:              QUINTIN ESPINO MD           
REPORT IS CONFIDENTIAL AND NOT TO BE RELEASED WITHOUT AUTHORIZATION
 
 
                                  [facility]
                                    [address] [place] [address]  [address]
_________________________________________________________________________________________
                                                                 Signed   
 
 
was reviewed for accuracy; however, sound-alike word errors, addition and/or 
deletions may occur. If there is any 
question about this report, please contact Client Services.
 
ADDITIONAL NOTES:
Immunohistochemical and/or in situ hybridization studies were performed on this 
case with the appropriate positive controls that react as expected.  This test 
was developed and its performance 
characteristics determined by [facility].  It has not been cleared or 
approved by the U.S. Food and Drug Administration.  The FDA has determined that 
such clearance or approval is not 
necessary.  This test is used for clinical purposes.  It should not be regarded 
as investigational or for research.  TM is certified under the 
Clinical Laboratory Improvement 
Amendments of 1988 (CLIA) as qualified to perform high complexity clinical 
laboratory testing.  This assay has not been validated for specimens that have 
been decalcified. 
The technical component was performed by [facility], [address] [place] [address] (CLIA# [id number]). 
 
[facility], [address] [place] [address] (CLIA# 
[id number]). 
 
PERFORMING LABORATORY:
The technical component was performed by [facility], [address] [place] [address] (CLIA# [id number]).  Professional interpretation was 
performed [facility], [address] [place] [address] (CLIA# 
[id number]). 
 
Diagnostician:  Liv Kerr MD
Pathologist
Electronically Signed 05/26/2022
 
 
Copies:                                
~
 
 
 
 
 
                                                                                    
_________________________________________________________________________________________
PATIENT NAME:     AMOS PEACOCK                     
MEDICAL RECORD #: [id number]            PATHOLOGY                     
          ACCT #: [id number]       ACCESSION #: [id number]     
DATE OF BIRTH:   [birth year]            REPORT #: [id number]       
PHYSICIAN:        MEÑO PATHOLOGY              
PCP:              QUINTIN ESPINO MD           
REPORT IS CONFIDENTIAL AND NOT TO BE RELEASED WITHOUT AUTHORIZATION

## 2022-05-26 NOTE — NUR
RN IN ROOM TO ADMINISTER SCHEDULED MEDICATIONS. PT ASLEEP IN BED UPON ENTRY -
EASILY AROUSABLE.
PT UP TO BSC - TRANSFERED SELF- BLACK TARRY STOOL NOTED ON CHUX. BED LINEN
CHANGED. PT ASKS FOR PRIVACY TO FINISH BM BY SELF. ASKED TO CALL NURSE WHEN
DONE TO HELP CLEAN.

## 2022-05-27 NOTE — NUR
CALL LIGHT RECENTLY ANSWERED, MARCO A AZUL IN ROOM TO EMPTY URINAL. ASSESSMENT
COMPLETE, NO ACUTE CHANGES. pt DENIES PAIN AND NAUSEA, WILL CONTINUE TO
MONITOR. CALL LIGHT IN REACH.

## 2022-05-27 NOTE — NUR
PT IS SITTING UP IN BED WATCHING TV. I&O AND VS CHARTED CALL LIGHT WITHIN
REACH NO FURTHER TASKS AT THIS TIME

## 2022-05-27 NOTE — NUR
SCHEDULED 0700 MEDS GIVEN, SEE EMAR. pt AWAKE AND RESTING IN BED. VSS AND
I&O'S COMPLETE. NO CHANGES TO CENTRAL LINE DRESSING, REMAINS HEP LOCKED FROM
EARLIER IN SHIFT. DRESSING INTACT, SMALL AMOUNT DRY BLOOD
NOTED AT INSERTION SITE UNDER DRESSING.
WILL MONITOR FOR CHANGES. NO ADDITIONAL NEEDS OR CONCERNS VERBALIZED, pt
CONTINUES TO REFUSE SCD'S. pt PROVIDED WITH VERBAL EDUCATION, pt AGAIN
DECLINES, pt INFORMED TO CALL STAFF VIA CALL LIGHT IF HE CHANGES HIS MIND. pt
VERBALIZES UNDERSTANDING.

## 2022-05-27 NOTE — NUR
rn in room to round on pt - cna in room completing i/o's. pt denies needs at
this time, is happy he got "real food" on dinner tray.
call light in reach.

## 2022-05-27 NOTE — NUR
SCDS SET UP AT BEDSIDE FOR PT USE, PT DECLINES TO WEAR AT THIS TIME, PT
UNDERSTANDS TO LET STAFF KNOW IN CASE PT CHANGES MIND

## 2022-05-27 NOTE — NUR
RN IN ROOM TO ADMINSITER SCHEDULED MEDICATIONS. PT RESTING IN BED UPON
ENTERING, WAKES EASILY WITH SOUND.
IJ FLUSHED AND HEP LOCKED X 3 LUMENS, PATENCY IN ALL THREE LUMENS. DRESSING IN
TACT.
PT REPOSISTIONS SELF FOR BREAKFAST - DENIES FURTHER NEEDS, CALL LIGHT REACH.

## 2022-05-27 NOTE — NUR
rn in room to administer shcheduled medications. pt resting in bed watching
tv. pt requests warm blanket - provided. denies further needs call light in
reach.

## 2022-05-27 NOTE — NUR
RN IN ROOM TO ASSESS PT - PT AWKAE JUST FINISHING WORKING WITH PT.
PT APPETITE NOTED TO BE INCREASING, ASKING FOR SNACKS "ANYTHING AND AS MUCH AS
POSSIBLE". PT TOLERATING MEALS AND SNACKS WITHOUT DIFFICULTY.
PT DRY IN BED, REPOSISTIONING SELF.
CALL LIGHT IN REACH.

## 2022-05-27 NOTE — NUR
pt RESTING IN BED, ON HIS LEFT SIDE. pt ABLE TO TURN SELF IN BED W/ USE OF
TRAPEZE. NO DISTRESS NOTED, WILL CONTINUE TO MONITOR. CALL LIGHT IN REACH OF
pt ALONG WITH PERSONAL BELONGINGS.

## 2022-05-27 NOTE — NUR
PT ALERT, ORIENTED AND STATED THAT PAIN IS AT 6-WHICH IS USUAL FOR HIM. PT
EXPLAINED THE NATURE OF HIS ACCIDENT, AND HOW PARALYSIS HAPPENED. SEEMS TO
BE COPING, FEELS WELL CARED FOR.GAVE G.POST AND PRAYED WITH PT. MARCO A HERNANDEZ
AND SN IN TO CONTINUE CARE FOR PT. WILL FOLLOW

## 2022-05-27 NOTE — NUR
pt AWAKE AND RESTING IN BED, WHEN OFFERED ASSISTANCE WITH REPOSITIONING, pt
DECLINED. WILL CONTINUE TO MONITOR. CALL LIGHT IN REACH.

## 2022-05-27 NOTE — NUR
REPORT RECEIVED FROM NIGHT RN COBY - PT RESTING IN BED WITH EYES CLOSED,
RR EVEN AND UNLABORED. CALL LIGHT IN REACH.

## 2022-05-27 NOTE — NUR
RN IN ROOM WITH STUDENT NURSE TO ASSESS PT.
PT REPORTS PAIN IN FEET 6/10. STATES ITS HIS "NORMAL" - DENIES NEEDING MED
COVERAGE.
DENIES NAUSEA, BOWEL TONES ACTIVE. PT INCONTINANT OF URINE IN BED, SMALL BM
SMEAR NOTED WHEN CLEANING. LINENS CHANGED.
CALL LIGHT IN REACH.

## 2022-05-27 NOTE — NUR
pt RESTING IN BED, LAYING ON HIS BACK. EYES CLOSED, RR EVEN AND UNLABORED, NO
DISTRESS NOTED. CALL LIGHT REMAINS IN REACH.

## 2022-05-27 NOTE — NUR
PATIENT ASSESMENT COMPLETED. PATIENTS VITALS TAKEN AND RECORDED. INTKAE AND
OUTPUT RECORDED. PATIENTS ALLYVN ON RIGHT CALF IS C/D/I. PATIENT DENIES ANY
PAIN. SCHEDULED PM MEDICATIONS GIVEN PER ORDER. PATIENT PROVIDED WITH SNACK
AND FRESH ICE WATER. PATIENTS IJ FLUSHED AND [id number] PER ORDER. PATIENT
DENIES ANY FURTHER NEEDS. CALL LIGHT IN REACH. PRN NICOTINE LOZENGE GIVEN PER
ORDER.

## 2022-05-27 NOTE — NUR
pt RESTING IN BED, ON RA. RR EVEN AND UNLABORED. NO DISTRESS OR SIGNS OF PAIN
NOTED, pt NOW RESTING ON HIS RIGHT SIDE. CALL LIGHT IN REACH.

## 2022-05-28 NOTE — NUR
FRESH TEA PROVIDED. PATIENT IS RESTING IN BED WATCHING TV. NO FURTHER NEEDS
NOTED. CALL LIGHT IN REACH.

## 2022-05-28 NOTE — NUR
PATIENT IN BED RESTING AT THIS TIME. VITALS AND I&O'S CHARTED. CALL LIGHT IN
REACH. NO FURTHER NEEDS AT THIS TIME.

## 2022-05-28 NOTE — NUR
VITALS TAKEN AND RECORDED. INTAKE AND OUTPUT RECORDED. AM MEDS GIVEN PER
ORDER. IJ FLUSHED AND SL PER ORDER. PATIENT DENIES ANY NEEDS. CALL LIGHT IN
REACH.

## 2022-05-28 NOTE — NUR
This RN in room for morning medication and assessment. Patient awake, a&ox4 at
this time. Patient reports he slept well and is eager to discharge home.
Patient denies pain and or nausea this morning. Good oral intake and q/s urine
output. Patient denies needs at this time. Personal supplies and call light
within reach.

## 2022-05-29 NOTE — CONS
[facility]
                                    [address], [place]  [address]
_________________________________________________________________________________________
                                                                 Signed   
 
 
DATE OF CONSULTATION:
05/22/2022
 
CONSULTING PHYSICIAN:
Marcelo Banda MD.
 
REQUESTING PHYSICIAN:
Dr. Oconnor.
 
PROBLEM:
GI bleeding, need for IV access; history of longstanding drug abuse.
 
HISTORY:
This 62-year-old white man presented to the emergency room with coffee-ground emesis.
He has had problems of GI bleeding in the past.  He is functionally paraplegic.  The
patient has longstanding drug abuse problems and in prior admissions in the hospital has
required central venous catheterization for IV access. 
 
Transfusion therapy is anticipated under the direction of the hospitalist, who has
admitted him (Dr. Miller). 
 
I was called for consideration of IV access as well as consultation regarding GI
bleeding. 
 
The patient denies any dysphagia or blood per rectum per se.  He does not have abdominal
pain. 
 
REVIEW OF SYSTEMS:
He denies any shortness of breath or chest pain.  He has had no hematochezia type
findings.  Denies any abdominal pain. 
 
PHYSICAL EXAMINATION:
GENERAL:  Relatively thin white male with a full beard.  He is reasonably alert and
oriented.  He does not appear to be systemically toxic. 
NECK:  Trachea is midline.  There is no jugular venous distention.  He has no
hoarseness. 
CHEST:  Clear. 
HEART:  Regular. 
ABDOMEN:  Soft and flat and thin, nontender. 
EXTREMITIES: Show no clubbing, cyanosis, or edema.
 
ASSESSMENT:
The patient has a complaint of presumed GI bleeding related to coffee-ground emesis and
underlying drug abuse.  The latter is likely a contributing factor to possible peptic
 
    Electronically Signed By: MARCELO BANDA MD  05/29/22 1924
_________________________________________________________________________________________
PATIENT NAME:     AMOS PEACOCK                     
MEDICAL RECORD #: [id number]            CONSULTATION                  
          ACCT #: [id number]  
DATE OF BIRTH:   [birth year]            REPORT #: [id number]      
PHYSICIAN:        MARCELO BANDA MD                 
PCP:              QUINTIN ESPNIO MD           
REPORT IS CONFIDENTIAL AND NOT TO BE RELEASED WITHOUT AUTHORIZATION
 
 
                                  [facility]
                                    [address], [place]  [address]
_________________________________________________________________________________________
                                                                 Signed   
 
 
ulceration.  He is going to be admitted to the intensive care unit under the direction
of Dr. Miller to undergo transfusion therapy.  He has poor peripheral access on the basis
of prior drug use. 
 
I discussed with him a plan of management to include access to the right internal
jugular vein, which is a safe and reliable access point for infusion of fluids, blood
and so on.  The risks of bleeding, infection, arterial injury, and other unforeseen
complications were reviewed with him.  He understands and wished to proceed. 
 
As regard to GI bleeding, he may ultimately require upper endoscopy and possibly
colonoscopy depending on clinical course.  That was discussed in less detail and will be
reviewed further as appropriate. 
 
 
 
            ________________________________________
            MD KAL Huang/YVETTE
Job #:  [id number]
DD:  05/23/2022 00:22:07
DT:  05/23/2022 03:36:17
 
cc:            MD Sanjuana Gan MD
 
 
Copies:  DAPHNEY MILLER MD, KELLY DEAN MD
~
 
 
 
 
 
 
 
 
 
 
 
 
 
    Electronically Signed By: MARCELO BANDA MD  05/29/22 1924
_________________________________________________________________________________________
PATIENT NAME:     AYUSHAMOS                     
MEDICAL RECORD #: [id number]            CONSULTATION                  
          ACCT #: [id number]  
DATE OF BIRTH:   [birth year]            REPORT #: [id number]      
PHYSICIAN:        MARCELO BANDA MD                 
PCP:              QUINTIN ESPINO MD           
REPORT IS CONFIDENTIAL AND NOT TO BE RELEASED WITHOUT AUTHORIZATION

## 2022-05-29 NOTE — OR
[facility]
                                    [address], [place]  [address]
_________________________________________________________________________________________
                                                                 Signed   
 
 
DATE OF OPERATION:
05/25/2022
 
SURGEON:
Marcelo Banda MD
 
PREOPERATIVE DIAGNOSES:
1. Anemia with associated possible melena.
2. Substance abuse problems.
3. History of coffee-ground emesis.
4. CT scan findings of left colonic thickening (asymptomatic).
 
POSTOPERATIVE DIAGNOSES:
1. Hiatal hernia with severe ulcerative distal esophagitis.
2. Profound sigmoid and left-sided diverticulosis.
3. Obstructing lesion, left colon at 60 cm (malignant or diverticular related).
 
PROCEDURE:
1. Esophagogastroduodenoscopy with biopsy.
2. Colonoscopy with biopsy.
 
ANESTHESIA:
Intravenous sedation propofol.  Marvel Barajas CRNA.
 
INDICATIONS:
This 62-year-old white male was admitted on May 22, 2022, having presented to the
emergency room with presumed coffee-grounds emesis and hematocrit of only 27.  He has
underlying substance abuse issues.  On that basis, he has poor peripheral access.  I was
initially consulted to place a central venous line for fluid resuscitation and so on.
This was placed successfully at about midnight on the night of admission with the right
internal jugular approach. 
 
He has been monitored, although he has had no further episodes of hematemesis,
coffee-ground emesis, or actual blood per rectum.  His hematocrit is noted to be 27.  A
CT scan was performed on the direction of Dr. Miller, which showed unusual thickening of
the distal esophagus as well as an area of circumferential inflammation or thickening of
the mid descending colon.  Notably, he has no obstructive symptoms of the colon and no
abdominal pain particularly. 
 
The patient underwent bowel prep yesterday, anticipating upper endoscopy and colonoscopy
today.  He understands as does his wife the risk of bleeding, infection, perforation,
and so on. 
 
    Electronically Signed By: MARCELO BANDA MD  05/29/22 1924
_________________________________________________________________________________________
PATIENT NAME:     AMOS PEACOCK                     
MEDICAL RECORD #: [id number]            OPERATIVE REPORT              
          ACCT #: [id number]  
DATE OF BIRTH:   [birth year]            REPORT #: [id number]      
PHYSICIAN:        MARCELO BANDA MD                 
PCP:              SERENA ESPINO MD           
REPORT IS CONFIDENTIAL AND NOT TO BE RELEASED WITHOUT AUTHORIZATION
 
 
                                  [facility]
                                    [address], [place]  [address]
_________________________________________________________________________________________
                                                                 Signed   
 
 
 
FINDINGS:
Upper endoscopy showed profound ulcerative distal esophagitis with inflammatory
exudates.  Hiatal hernia was noted as well.  The stomach and duodenum had mild
inflammation, but no ulceration. 
 
On colonoscopy, the prep was adequate.  He had profound diverticular change of the
sigmoid and left colon and most notably an area of inflammatory or neoplastic narrowing
which precluded passage of the scope through the area.  Given the texture of the
findings on biopsy, a reasonable suspicion exists that this may represent neoplasm
rather than inflammatory stricture from diverticular disease though either is possible. 
 
DESCRIPTION OF PROCEDURE:
The patient was brought to the surgical endoscopy suite given topical lidocaine
hypopharyngeal anesthesia and placed in lateral decubitus position.  Intravenous
sedation was given to a point of slurred speech and nystagmus with propofol infusional
technique as directed by the anesthetist.  A bite block was placed.  An Olympus video
upper endoscope was passed in the hypopharynx.  The vocal cords appeared normal.  Scope
was advanced to the esophagus and from the distal 3rd onward were yellow plaque-like
inflammatory exudate changes.  Scope was passed into the stomach, which was insufflated
with air.  Rugal folds appeared normal.  Pylorus was normal.  Scope was passed through
into the duodenum, which was normal.  Biopsies were taken of the duodenum to assess for
celiac disease.  The scope was withdrawn.  Biopsies taken of the antrum for both BRIDGET and
pathologic testing.  Retroflexed view showed a sizable hiatal hernia and poor flap
valve.  The scope was withdrawn to the distal esophagus where the plaque-like areas were
gently abraded with the scope showing them to be inflammatory exudates.  Biopsies were
taken of the underlying esophageal mucosa which was quite profoundly inflamed.  It is
unlikely that this represents malignancy proper however.  Multiple biopsies were taken
in this area.  The scope was withdrawn to the mid esophagus where a finger-like plaque
extensions were noted.  The more proximal esophagus was normal.  Scope was removed. 
 
Plans were then made for colonoscopy.  Digital rectal examination was normal.  An
Olympus video colonoscope was passed in the rectum and manipulated into the sigmoid
which had complex and multiple diverticular changes.  The scope was advanced beyond this
sigmoid area showing numerous diverticula and distortion.  An area of mucosal
inflammation was noted at about 50 cm.  The passage beyond this area showed an area
densely narrowed and highly suspicious for malignancy initially.  Various manipulations
to pass beyond it were unsuccessful.  Clearly, the lumen was visualized.  This did not
represent a diverticulum itself.  The tissue was quite dense.  Multiple biopsies were
obtained and a plastic like consistency was noted, which again in my opinion is
suspicious for malignancy, though not diagnostic of it.  The scope was withdrawn and the
more distal inflammatory area biopsied as well.  Further withdrawal of scope showed only
 
    Electronically Signed By: MARCELO BANDA MD  05/29/22 1924
_________________________________________________________________________________________
PATIENT NAME:     AMOS PEACOCK                     
MEDICAL RECORD #: [id number]            OPERATIVE REPORT              
          ACCT #: [id number]  
DATE OF BIRTH:   [birth year]            REPORT #: [id number]      
PHYSICIAN:        MARCELO BANDA MD                 
PCP:              SERENA ESPINO MD           
REPORT IS CONFIDENTIAL AND NOT TO BE RELEASED WITHOUT AUTHORIZATION
 
 
                                  [address], [place]  [address]
_________________________________________________________________________________________
                                                                 Signed   
 
 
diverticulosis.  The rectum was normal.  Scope was removed.  The patient was taken to
the recovery room in good condition. 
 
CONCLUDING DIAGNOSES:
1. Severe ulcerative distal esophagitis with inflammatory exudates.
2. Left colonic nearly obstructing mucosal change either malignant or diverticular
related. 
 
PLAN:
He will be maintained on PPI medication as well as topical Carafate for the proximal
esophageal problem.  Cessation of any illicit drug use will be essential to his recovery
in that regard. 
 
As regards to colon; biopsies have been obtained.  If malignancy is noted, then
resectional therapy would be offered.  If no malignancy is identified, then repeat 
colonoscopy with balloon dilation may be a consideration; he does not have obstructive
symptoms currently. 
 
 
 
            ________________________________________
            MD KAL Huang/TASIAL
Job #:  [id number]
DD:  05/25/2022 08:38:04
DT:  05/25/2022 11:10:16
 
cc:            MD Serena Gan MD
 
 
Copies:  DAPHNEY MILLER MD
~
 
 
 
 
 
 
 
    Electronically Signed By: MARCELO BANDA MD  05/29/22 1924
_________________________________________________________________________________________
PATIENT NAME:     AMOS PEACOCK                     
MEDICAL RECORD #: [id number]            OPERATIVE REPORT              
          ACCT #: [id number]  
DATE OF BIRTH:   [birth year]            REPORT #: [id number]      
PHYSICIAN:        MARCELO BANDA MD                 
PCP:              SERENA ESPINO MD           
REPORT IS CONFIDENTIAL AND NOT TO BE RELEASED WITHOUT AUTHORIZATION

## 2022-05-29 NOTE — OR
[facility]
                                    [address], [place]  [address]
_________________________________________________________________________________________
                                                                 Signed   
 
 
DATE OF OPERATION:
05/22/2022
 
SURGEON:
Marcelo Banda MD
 
PREOPERATIVE DIAGNOSES:
1. Coffee-grounds emesis, presumed gastrointestinal bleeding and anemia.
2. Longstanding IV drug abuse with poor peripheral access.
 
POSTOPERATIVE DIAGNOSES:
1. Coffee-grounds emesis, presumed gastrointestinal bleeding and anemia.
2. Longstanding IV drug abuse with poor peripheral access.
 
PROCEDURES:
1. Ultrasound showed right internal jugular venous access.
2. Placement of right triple-lumen Arrow high-pressure catheter.
 
ANESTHESIA:
1% lidocaine.
 
INDICATIONS:
A 62-year-old white man is requesting for central venous access, having been admitted by
Dr. Miller for GI bleeding.  Peripheral access is lacking on the basis of his
longstanding drug use.  The risks of bleeding, infection, arterial injury, misplacement
of the catheter and other unforeseen complications were reviewed with him.  He
understands and wished to proceed. 
 
FINDINGS:
Ultimately reliable access to the right internal jugular vein was accomplished.  The
catheter was highly functional at completion of the procedure and chest x-ray shows no
evidence of complication.  The tip of the catheter was in the superior vena cava, it is
noted; he does have some difficulty in evaluating fully the mediastinum related to
spinal hardware. 
 
DESCRIPTION OF PROCEDURE:
The patient was placed in mild Trendelenburg position with the head turned to the left
and the neck and upper torso were prepared with chlorhexidine solution and draped
sterilely.  An Arrow triple-lumen high-pressure catheter kit was used.  1% lidocaine was
injected over the right sternocleidomastoid muscle.  Access with a Seldinger technique
was undertaken initially obtaining blood return, but not sustained.  Additional efforts
were unsuccessful and on that basis a Coridon portable ultrasound device was used with
 
    Electronically Signed By: MARCELO BANDA MD  05/29/22 1924
_________________________________________________________________________________________
PATIENT NAME:     AMOS PEACOCK                     
MEDICAL RECORD #: [id number]            OPERATIVE REPORT              
          ACCT #: [id number]  
DATE OF BIRTH:   [birth year]            REPORT #: [id number]      
PHYSICIAN:        MARCELO BANDA MD                 
PCP:              QUINTIN ESPINO MD           
REPORT IS CONFIDENTIAL AND NOT TO BE RELEASED WITHOUT AUTHORIZATION
 
 
                                  [facility]
                                    [address], [place]  [address]
_________________________________________________________________________________________
                                                                 Signed   
 
 
sterile sleeve identifying well a well distended right internal jugular vein and a
smaller adjacent carotid artery.  Under direct visualization, the right internal jugular
vein was accessed showing dark nonpulsatile blood.  A flexible J-wire was passed down
the needle.  The needle was removed.  The site was incised with an 11 blade and a blue
dilator passed over this.  A previously inspected blue power tolerant triple-lumen
catheter was passed over the wire.  The wire was withdrawn.  Aspiration of the distal
port with saline solution showed dark nonpulsatile blood,  __________ was applied to
that site and it was flushed further.  All the port sites were highly functional.  The
catheter did not easily bend to withdraw it much and therefore it was left in situ as
is.  It was secured to the skin with a nylon stitch and an OpSite applied.
Anti-infective disk was applied prior to application of the OpSite it is noted.  A
photograph was taken during the course of the procedure regarding the venous access.  A
postprocedure chest x-ray was performed showing no sign of complication.  The catheter
was noted to be in the superior vena cava.  Spinal hardware obscured the actual tip, but
it did not appear to be extending to the atrium proper. 
 
 
 
            ________________________________________
            MD KAL Huang/TASIAL
Job #:  [id number]
DD:  05/23/2022 00:26:16
DT:  05/23/2022 03:58:13
 
cc:            MD Sanjuana Gan MD
 
 
Copies:  DAPHNEY MILLER MD, KELLY DEAN MD
~
 
 
 
 
 
 
 
 
    Electronically Signed By: MARCELO BANDA MD  05/29/22 1924
_________________________________________________________________________________________
PATIENT NAME:     AMOS PEACOCK                     
MEDICAL RECORD #: [id number]            OPERATIVE REPORT              
          ACCT #: [id number]  
DATE OF BIRTH:   [birth year]            REPORT #: [id number]      
PHYSICIAN:        MARCELO BANDA MD                 
PCP:              QUINTIN ESPINO MD           
REPORT IS CONFIDENTIAL AND NOT TO BE RELEASED WITHOUT AUTHORIZATION

## 2022-10-29 ENCOUNTER — HOSPITAL ENCOUNTER (EMERGENCY)
Dept: HOSPITAL 46 - ED | Age: 62
Discharge: HOME | End: 2022-10-29
Payer: MEDICARE

## 2022-10-29 VITALS — WEIGHT: 204.57 LBS | BODY MASS INDEX: 27.11 KG/M2 | HEIGHT: 73 IN

## 2022-10-29 DIAGNOSIS — Z23: ICD-10-CM

## 2022-10-29 DIAGNOSIS — Z79.899: ICD-10-CM

## 2022-10-29 DIAGNOSIS — F17.200: ICD-10-CM

## 2022-10-29 DIAGNOSIS — S81.811A: Primary | ICD-10-CM

## 2022-10-29 DIAGNOSIS — F20.9: ICD-10-CM
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
## 2022-10-29 DIAGNOSIS — J44.9: ICD-10-CM

## 2022-10-29 PROCEDURE — A9270 NON-COVERED ITEM OR SERVICE: HCPCS
